# Patient Record
Sex: FEMALE | Race: ASIAN | NOT HISPANIC OR LATINO
[De-identification: names, ages, dates, MRNs, and addresses within clinical notes are randomized per-mention and may not be internally consistent; named-entity substitution may affect disease eponyms.]

---

## 2017-02-28 ENCOUNTER — APPOINTMENT (OUTPATIENT)
Dept: ENDOCRINOLOGY | Facility: CLINIC | Age: 42
End: 2017-02-28

## 2017-02-28 VITALS
SYSTOLIC BLOOD PRESSURE: 131 MMHG | BODY MASS INDEX: 28.55 KG/M2 | HEIGHT: 58 IN | WEIGHT: 136 LBS | DIASTOLIC BLOOD PRESSURE: 92 MMHG | HEART RATE: 93 BPM

## 2017-03-14 ENCOUNTER — APPOINTMENT (OUTPATIENT)
Dept: ENDOCRINOLOGY | Facility: CLINIC | Age: 42
End: 2017-03-14

## 2017-03-23 ENCOUNTER — APPOINTMENT (OUTPATIENT)
Dept: ENDOCRINOLOGY | Facility: CLINIC | Age: 42
End: 2017-03-23

## 2017-06-18 PROBLEM — Z00.00 ENCOUNTER FOR PREVENTIVE HEALTH EXAMINATION: Noted: 2017-06-18

## 2017-07-05 ENCOUNTER — RX RENEWAL (OUTPATIENT)
Age: 42
End: 2017-07-05

## 2017-07-17 ENCOUNTER — APPOINTMENT (OUTPATIENT)
Dept: INTERNAL MEDICINE | Facility: CLINIC | Age: 42
End: 2017-07-17

## 2017-07-17 ENCOUNTER — LABORATORY RESULT (OUTPATIENT)
Age: 42
End: 2017-07-17

## 2017-07-17 VITALS
SYSTOLIC BLOOD PRESSURE: 124 MMHG | DIASTOLIC BLOOD PRESSURE: 96 MMHG | TEMPERATURE: 98.7 F | HEIGHT: 58 IN | WEIGHT: 135 LBS | BODY MASS INDEX: 28.34 KG/M2 | HEART RATE: 82 BPM | OXYGEN SATURATION: 98 %

## 2017-07-17 DIAGNOSIS — R07.89 OTHER CHEST PAIN: ICD-10-CM

## 2017-07-17 DIAGNOSIS — M54.6 PAIN IN THORACIC SPINE: ICD-10-CM

## 2017-07-21 LAB
25(OH)D3 SERPL-MCNC: 34.8 NG/ML
ALBUMIN SERPL ELPH-MCNC: 4.1 G/DL
ALP BLD-CCNC: 50 U/L
ALT SERPL-CCNC: 26 U/L
ANION GAP SERPL CALC-SCNC: 15 MMOL/L
APPEARANCE: ABNORMAL
AST SERPL-CCNC: 24 U/L
BASOPHILS # BLD AUTO: 0.03 K/UL
BASOPHILS NFR BLD AUTO: 0.4 %
BILIRUB SERPL-MCNC: 0.2 MG/DL
BILIRUBIN URINE: NEGATIVE
BLOOD URINE: ABNORMAL
BUN SERPL-MCNC: 12 MG/DL
CALCIUM SERPL-MCNC: 9.3 MG/DL
CHLORIDE SERPL-SCNC: 104 MMOL/L
CHOLEST SERPL-MCNC: 206 MG/DL
CHOLEST/HDLC SERPL: 3.9 RATIO
CO2 SERPL-SCNC: 21 MMOL/L
COLOR: ABNORMAL
CREAT SERPL-MCNC: 1 MG/DL
EOSINOPHIL # BLD AUTO: 0.88 K/UL
EOSINOPHIL NFR BLD AUTO: 11.6 %
GLUCOSE QUALITATIVE U: NORMAL MG/DL
GLUCOSE SERPL-MCNC: 80 MG/DL
HBA1C MFR BLD HPLC: 5.6 %
HCT VFR BLD CALC: 42.1 %
HDLC SERPL-MCNC: 53 MG/DL
HGB BLD-MCNC: 13.6 G/DL
IMM GRANULOCYTES NFR BLD AUTO: 0.1 %
KETONES URINE: ABNORMAL
LDLC SERPL CALC-MCNC: 116 MG/DL
LEUKOCYTE ESTERASE URINE: ABNORMAL
LYMPHOCYTES # BLD AUTO: 2.5 K/UL
LYMPHOCYTES NFR BLD AUTO: 32.9 %
MAN DIFF?: NORMAL
MCHC RBC-ENTMCNC: 31 PG
MCHC RBC-ENTMCNC: 32.3 GM/DL
MCV RBC AUTO: 95.9 FL
MONOCYTES # BLD AUTO: 0.71 K/UL
MONOCYTES NFR BLD AUTO: 9.4 %
NEUTROPHILS # BLD AUTO: 3.46 K/UL
NEUTROPHILS NFR BLD AUTO: 45.6 %
NITRITE URINE: NEGATIVE
PH URINE: 5.5
PLATELET # BLD AUTO: 248 K/UL
POTASSIUM SERPL-SCNC: 4 MMOL/L
PROT SERPL-MCNC: 7.4 G/DL
PROTEIN URINE: NEGATIVE MG/DL
RBC # BLD: 4.39 M/UL
RBC # FLD: 13.6 %
SODIUM SERPL-SCNC: 140 MMOL/L
SPECIFIC GRAVITY URINE: 1.03
T4 SERPL-MCNC: 7.8 UG/DL
THYROGLOB AB SERPL-ACNC: 780 IU/ML
THYROPEROXIDASE AB SERPL IA-ACNC: 922 IU/ML
TRIGL SERPL-MCNC: 184 MG/DL
TSH SERPL-ACNC: 1.02 UIU/ML
UROBILINOGEN URINE: NORMAL MG/DL
WBC # FLD AUTO: 7.59 K/UL

## 2017-07-24 ENCOUNTER — LABORATORY RESULT (OUTPATIENT)
Age: 42
End: 2017-07-24

## 2017-07-25 LAB
APPEARANCE: CLEAR
BILIRUBIN URINE: NEGATIVE
BLOOD URINE: ABNORMAL
COLOR: YELLOW
GLUCOSE QUALITATIVE U: NORMAL MG/DL
KETONES URINE: NEGATIVE
LEUKOCYTE ESTERASE URINE: ABNORMAL
NITRITE URINE: NEGATIVE
PH URINE: 5
PROTEIN URINE: NEGATIVE MG/DL
SPECIFIC GRAVITY URINE: 1.01
UROBILINOGEN URINE: NORMAL MG/DL

## 2017-07-27 LAB — BACTERIA UR CULT: ABNORMAL

## 2018-07-30 ENCOUNTER — APPOINTMENT (OUTPATIENT)
Dept: INTERNAL MEDICINE | Facility: CLINIC | Age: 43
End: 2018-07-30
Payer: COMMERCIAL

## 2018-07-30 ENCOUNTER — LABORATORY RESULT (OUTPATIENT)
Age: 43
End: 2018-07-30

## 2018-07-30 VITALS
OXYGEN SATURATION: 99 % | BODY MASS INDEX: 30.44 KG/M2 | TEMPERATURE: 97.8 F | HEART RATE: 79 BPM | DIASTOLIC BLOOD PRESSURE: 86 MMHG | SYSTOLIC BLOOD PRESSURE: 120 MMHG | HEIGHT: 58 IN | WEIGHT: 145 LBS

## 2018-07-30 DIAGNOSIS — E01.0 IODINE-DEFICIENCY RELATED DIFFUSE (ENDEMIC) GOITER: ICD-10-CM

## 2018-07-30 DIAGNOSIS — Z13.89 ENCOUNTER FOR SCREENING FOR OTHER DISORDER: ICD-10-CM

## 2018-07-30 DIAGNOSIS — R06.01 ORTHOPNEA: ICD-10-CM

## 2018-07-30 DIAGNOSIS — G43.909 MIGRAINE, UNSPECIFIED, NOT INTRACTABLE, W/OUT STATUS MIGRAINOSUS: ICD-10-CM

## 2018-07-30 PROCEDURE — 99396 PREV VISIT EST AGE 40-64: CPT | Mod: 25

## 2018-07-30 PROCEDURE — 36415 COLL VENOUS BLD VENIPUNCTURE: CPT

## 2018-07-30 PROCEDURE — G0447 BEHAVIOR COUNSEL OBESITY 15M: CPT

## 2018-07-30 PROCEDURE — 99212 OFFICE O/P EST SF 10 MIN: CPT | Mod: 25

## 2018-07-30 PROCEDURE — G0444 DEPRESSION SCREEN ANNUAL: CPT

## 2018-07-30 RX ORDER — IBUPROFEN 600 MG/1
600 TABLET, FILM COATED ORAL
Qty: 20 | Refills: 0 | Status: DISCONTINUED | COMMUNITY
Start: 2018-03-12

## 2018-07-30 RX ORDER — RANITIDINE 150 MG/1
150 TABLET ORAL
Qty: 60 | Refills: 0 | Status: DISCONTINUED | COMMUNITY
Start: 2017-12-06

## 2018-07-30 NOTE — HISTORY OF PRESENT ILLNESS
[FreeTextEntry1] : She is now 43 years old.\par In the past year, no hospitalizations, no surgery, no new medical diagnoses [de-identified] : Now gives a history of mild orthopnea with difficulty breathing when she lies flat. This has been present now for a couple of years, but it is nonprogressive.\par

## 2018-07-30 NOTE — ASSESSMENT
[FreeTextEntry1] : Health maintenance.\par Her BMI is elevated and at least a 20 pound weight loss is recommended. We had an extended conversation lasting at least 10 minutes regarding this issue and I reviewed obesity-associated morbidities including possible contribution to her orthopnea. We also discussed dietary and lifestyle approaches to achieve the desired weight loss in detail. At the current time she declines referral to obesity management.\par Daily aerobic exercise is strongly recommended.\par No STD risk or substance abuse per patient report.\par Yearly GYN examination is strongly recommended.\par Occasional gender specific salt examination is suggested.\par HIV antibody sent with patient approval.\par States will arrange her own mammogram followup later this year. I have asked her to have results sent to me.\par No serious depression. Competent with ADLs.\par Yearly flu vaccine is recommended.\par \par Orthopnea.\par We had an extended conversation regarding possible contributing factors to this symptom. Patient specifically denies any dyspnea on exertion or significant limitation of her activity or ambulation. I reassured her that her lung exam is completely normal as is her cardiac exam.\par Based on the above, it seems likely that this symptom does not represent cardiopulmonary disease it is more likely to be the result of increasing obesity with extrathoracic lung restriction especially when lying down. Her oxygen saturation is normal.\par The patient requests, chest x-ray has been ordered.\par She is strongly encouraged to lose weight and to maintain aerobic exercise to improve her conditioning.

## 2018-07-30 NOTE — HEALTH RISK ASSESSMENT
[Good] : ~his/her~  mood as  good [No falls in past year] : Patient reported no falls in the past year [Patient reported mammogram was normal] : Patient reported mammogram was normal [Patient reported PAP Smear was normal] : Patient reported PAP Smear was normal [HIV Test offered] : HIV Test offered [With Significant Other] : lives with significant other [Employed] : employed [] :  [Sexually Active] : sexually active [Fully functional (bathing, dressing, toileting, transferring, walking, feeding)] : Fully functional (bathing, dressing, toileting, transferring, walking, feeding) [Fully functional (using the telephone, shopping, preparing meals, housekeeping, doing laundry, using] : Fully functional and needs no help or supervision to perform IADLs (using the telephone, shopping, preparing meals, housekeeping, doing laundry, using transportation, managing medications and managing finances) [Reports changes in dental health] : Reports changes in dental health [Smoke Detector] : smoke detector [Seat Belt] :  uses seat belt [Sunscreen] : uses sunscreen [Patient declined discussion] : Patient declined discussion [] : No [Change in mental status noted] : No change in mental status noted [High Risk Behavior] : no high risk behavior [Reports changes in hearing] : Reports no changes in hearing [Reports changes in vision] : Reports no changes in vision [TB Exposure] : is not being exposed to tuberculosis [MammogramDate] : 8/2017 [PapSmearDate] : 8/2017 [de-identified] : Recent oral surgery

## 2018-07-30 NOTE — COUNSELING
[Weight management counseling provided] : Weight management [Healthy eating counseling provided] : healthy eating [Activity counseling provided] : activity [Good understanding] : Patient has a good understanding of lifestyle changes and the steps needed to achieve self management goals [ - Annual Depression Screening] : Annual Depression Screening [ - Behavioral Counseling for Obesity (Face-to-Face for 15 Minutes)] : Behavioral Counseling for Obesity (Face-to-Face for 15 Minutes)

## 2018-08-02 LAB
25(OH)D3 SERPL-MCNC: 22 NG/ML
ALBUMIN SERPL ELPH-MCNC: 4.2 G/DL
ALP BLD-CCNC: 58 U/L
ALT SERPL-CCNC: 17 U/L
ANION GAP SERPL CALC-SCNC: 17 MMOL/L
APPEARANCE: ABNORMAL
AST SERPL-CCNC: 20 U/L
BASOPHILS # BLD AUTO: 0.03 K/UL
BASOPHILS NFR BLD AUTO: 0.4 %
BILIRUB SERPL-MCNC: 0.4 MG/DL
BILIRUBIN URINE: NEGATIVE
BLOOD URINE: NEGATIVE
BUN SERPL-MCNC: 13 MG/DL
CALCIUM SERPL-MCNC: 9.2 MG/DL
CHLORIDE SERPL-SCNC: 103 MMOL/L
CHOLEST SERPL-MCNC: 195 MG/DL
CHOLEST/HDLC SERPL: 4.1 RATIO
CO2 SERPL-SCNC: 20 MMOL/L
COLOR: YELLOW
CREAT SERPL-MCNC: 0.72 MG/DL
EOSINOPHIL # BLD AUTO: 0.46 K/UL
EOSINOPHIL NFR BLD AUTO: 6.8 %
GLUCOSE QUALITATIVE U: NEGATIVE MG/DL
GLUCOSE SERPL-MCNC: 85 MG/DL
HCT VFR BLD CALC: 41.8 %
HDLC SERPL-MCNC: 47 MG/DL
HGB BLD-MCNC: 13.4 G/DL
HIV1+2 AB SPEC QL IA.RAPID: NONREACTIVE
IMM GRANULOCYTES NFR BLD AUTO: 0.3 %
KETONES URINE: NEGATIVE
LDLC SERPL CALC-MCNC: 119 MG/DL
LEUKOCYTE ESTERASE URINE: ABNORMAL
LYMPHOCYTES # BLD AUTO: 2.36 K/UL
LYMPHOCYTES NFR BLD AUTO: 35 %
MAN DIFF?: NORMAL
MCHC RBC-ENTMCNC: 30.3 PG
MCHC RBC-ENTMCNC: 32.1 GM/DL
MCV RBC AUTO: 94.6 FL
MONOCYTES # BLD AUTO: 0.55 K/UL
MONOCYTES NFR BLD AUTO: 8.2 %
NEUTROPHILS # BLD AUTO: 3.32 K/UL
NEUTROPHILS NFR BLD AUTO: 49.3 %
NITRITE URINE: NEGATIVE
PH URINE: 6.5
PLATELET # BLD AUTO: 272 K/UL
POTASSIUM SERPL-SCNC: 3.7 MMOL/L
PROT SERPL-MCNC: 7.1 G/DL
PROTEIN URINE: NEGATIVE MG/DL
RBC # BLD: 4.42 M/UL
RBC # FLD: 13.8 %
SODIUM SERPL-SCNC: 140 MMOL/L
SPECIFIC GRAVITY URINE: 1.02
T4 FREE SERPL-MCNC: 1.4 NG/DL
TRIGL SERPL-MCNC: 145 MG/DL
TSH SERPL-ACNC: 0.62 UIU/ML
UROBILINOGEN URINE: NEGATIVE MG/DL
WBC # FLD AUTO: 6.74 K/UL

## 2018-11-05 ENCOUNTER — RESULT REVIEW (OUTPATIENT)
Age: 43
End: 2018-11-05

## 2018-11-27 ENCOUNTER — MOBILE ON CALL (OUTPATIENT)
Age: 43
End: 2018-11-27

## 2018-12-24 ENCOUNTER — APPOINTMENT (OUTPATIENT)
Dept: ENDOCRINOLOGY | Facility: CLINIC | Age: 43
End: 2018-12-24
Payer: COMMERCIAL

## 2018-12-24 VITALS
HEART RATE: 73 BPM | WEIGHT: 143 LBS | HEIGHT: 58 IN | DIASTOLIC BLOOD PRESSURE: 84 MMHG | BODY MASS INDEX: 30.02 KG/M2 | SYSTOLIC BLOOD PRESSURE: 122 MMHG

## 2018-12-24 LAB — HBA1C MFR BLD HPLC: 5.8

## 2018-12-24 PROCEDURE — 99215 OFFICE O/P EST HI 40 MIN: CPT | Mod: 25

## 2018-12-24 PROCEDURE — 83036 HEMOGLOBIN GLYCOSYLATED A1C: CPT | Mod: QW

## 2018-12-24 RX ORDER — CIPROFLOXACIN HYDROCHLORIDE 500 MG/1
500 TABLET, FILM COATED ORAL TWICE DAILY
Qty: 10 | Refills: 0 | Status: DISCONTINUED | COMMUNITY
Start: 2018-08-02 | End: 2018-12-24

## 2019-02-25 ENCOUNTER — APPOINTMENT (OUTPATIENT)
Dept: ENDOCRINOLOGY | Facility: CLINIC | Age: 44
End: 2019-02-25
Payer: COMMERCIAL

## 2019-02-25 VITALS
DIASTOLIC BLOOD PRESSURE: 92 MMHG | SYSTOLIC BLOOD PRESSURE: 140 MMHG | WEIGHT: 137 LBS | HEIGHT: 58 IN | HEART RATE: 81 BPM | BODY MASS INDEX: 28.76 KG/M2

## 2019-02-25 PROCEDURE — 99214 OFFICE O/P EST MOD 30 MIN: CPT

## 2019-02-25 PROCEDURE — 97802 MEDICAL NUTRITION INDIV IN: CPT

## 2019-02-25 NOTE — HISTORY OF PRESENT ILLNESS
[FreeTextEntry1] : Ms. Hale is a 43 year-old woman with a history of polycystic ovarian syndrome, overweight, elevated Hg A1c, euthyroid Hashimoto's disease, vitamin D deficiency presenting for follow-up. I saw her for an initial visit in December 2018; she is a former patient of Dr. Bui.\par \par Polycystic ovarian syndrome/overweight/elevated Hg A1c. Point-of-care Hg A1c 5.8% in December 2018. \par She was diagnosed with polycystic ovarian syndrome in her 20s in the setting of irregular menses, acne, hirsutism. Acne and hirsutism not currently an issue for her.\par She was previously on a combined oral contraceptive pill, off since around March 2017. She has had withdrawal bleeding every month, although periods are not coming completely regularly.\par Her early adult weight was 90 pounds, gradually increased up to 145 pounds. She was down to 115 pounds following a very restrictive diet, but gained back the weight. She was down to 135 pounds on bupropion and metformin. \par She tolerated bupropion well. She complained of forgetfulness on metformin. \par \par Euthyroid Hashimoto's disease.\par She has a history of positive thyroid antibodies. \par Most recent thyroid function tests from July 2018 were normal.\par \par Vitamin D deficiency.\par Most recent 25-hydroxyvitamin D 22.0 ng/mL in July 2018; down as low as 10.3 ng/mL in November 2016.\par \par Interim History \par Last visit we restarted bupropion and ergocalciferol. She is tolerating well. \par She saw nutrition today.\par She will be celebrating her 23rd wedding anniversary in April. \par She lost 6 pounds since last visit. She stopped drinking soda and decreasing sugar. Hoarse voice resolved. No denies dysphagia, fixed/hard neck mass, shortness of breath. She denies palpitations, diarrhea/constipation, hair/skin changes, depression. Menses are regular.\par Medical and surgical history, medications, allergies, social and family history reviewed and updated as needed.

## 2019-02-25 NOTE — ASSESSMENT
[FreeTextEntry1] : Polycystic ovarian syndrome/overweight/elevated Hg A1c. Point-of-care Hg A1c 5.8% in December 2018. She is having withdrawal bleeding every month and facial hair/acne not currently an issue. She prefers to remain off an oral contraceptive pill for now. We reviewed lifestyle modification for weight loss. Last visit we restarted bupropion and she is tolerating well. I congratulated her on her weight loss.\par Lifestyle modification\par Follow-up nutrition\par Continue bupropion  mg twice daily\par \par Euthyroid Hashimoto's disease. She has a history of positive thyroid antibodies. She has been clinically and biochemically euthyroid.\par Check TSH annually or earlier if symptoms\par \par Vitamin D deficiency. Most recent 25-hydroxyvitamin D 22.0 ng/mL in July 2018; down as low as 10.3 ng/mL in November 2016.\par Continue ergocalciferol 50,000 intl units weekly x 3 months, then every 2 weeks\par \par Return to clinic in 3 months.

## 2019-02-25 NOTE — PHYSICAL EXAM
[Alert] : alert [No Acute Distress] : no acute distress [Healthy Appearance] : healthy appearance [Normal Sclera/Conjunctiva] : normal sclera/conjunctiva [Normal Oropharynx] : the oropharynx was normal [No Neck Mass] : no neck mass was observed [Supple] : the neck was supple [No LAD] : no lymphadenopathy [Thyroid Not Enlarged] : the thyroid was not enlarged [No Thyroid Nodules] : there were no palpable thyroid nodules [Normal Rate and Effort] : normal respiratory rhythm and effort [Clear to Auscultation] : lungs were clear to auscultation bilaterally [Normal Rate] : heart rate was normal  [Normal S1, S2] : normal S1 and S2 [Regular Rhythm] : with a regular rhythm [No Stigmata of Cushings Syndrome] : no stigmata of cushings syndrome [Normal Gait] : normal gait [Normal Insight/Judgement] : insight and judgment were intact [Kyphosis] : no kyphosis present [Acne] : no acne [Hirsutism] : no hirsutism [Acanthosis Nigricans] : no acanthosis nigricans [de-identified] : no moon facies, no supraclavicular fat pads

## 2019-04-04 ENCOUNTER — TRANSCRIPTION ENCOUNTER (OUTPATIENT)
Age: 44
End: 2019-04-04

## 2019-04-18 ENCOUNTER — RX RENEWAL (OUTPATIENT)
Age: 44
End: 2019-04-18

## 2019-05-06 ENCOUNTER — APPOINTMENT (OUTPATIENT)
Dept: ENDOCRINOLOGY | Facility: CLINIC | Age: 44
End: 2019-05-06

## 2019-08-13 ENCOUNTER — RX RENEWAL (OUTPATIENT)
Age: 44
End: 2019-08-13

## 2019-08-18 ENCOUNTER — LABORATORY RESULT (OUTPATIENT)
Age: 44
End: 2019-08-18

## 2019-08-19 ENCOUNTER — APPOINTMENT (OUTPATIENT)
Dept: INTERNAL MEDICINE | Facility: CLINIC | Age: 44
End: 2019-08-19
Payer: COMMERCIAL

## 2019-08-19 VITALS
DIASTOLIC BLOOD PRESSURE: 78 MMHG | BODY MASS INDEX: 29.26 KG/M2 | SYSTOLIC BLOOD PRESSURE: 108 MMHG | WEIGHT: 140 LBS | OXYGEN SATURATION: 98 % | TEMPERATURE: 98.1 F | HEART RATE: 87 BPM

## 2019-08-19 DIAGNOSIS — Z12.11 ENCOUNTER FOR SCREENING FOR MALIGNANT NEOPLASM OF COLON: ICD-10-CM

## 2019-08-19 DIAGNOSIS — Z80.0 ENCOUNTER FOR SCREENING FOR MALIGNANT NEOPLASM OF COLON: ICD-10-CM

## 2019-08-19 DIAGNOSIS — N39.0 URINARY TRACT INFECTION, SITE NOT SPECIFIED: ICD-10-CM

## 2019-08-19 DIAGNOSIS — Z23 ENCOUNTER FOR IMMUNIZATION: ICD-10-CM

## 2019-08-19 DIAGNOSIS — M25.511 PAIN IN RIGHT SHOULDER: ICD-10-CM

## 2019-08-19 PROCEDURE — 99396 PREV VISIT EST AGE 40-64: CPT | Mod: 25

## 2019-08-19 PROCEDURE — 36415 COLL VENOUS BLD VENIPUNCTURE: CPT

## 2019-08-30 ENCOUNTER — APPOINTMENT (OUTPATIENT)
Dept: MAMMOGRAPHY | Facility: CLINIC | Age: 44
End: 2019-08-30

## 2019-10-07 ENCOUNTER — APPOINTMENT (OUTPATIENT)
Dept: GASTROENTEROLOGY | Facility: HOSPITAL | Age: 44
End: 2019-10-07

## 2019-11-08 ENCOUNTER — APPOINTMENT (OUTPATIENT)
Dept: GASTROENTEROLOGY | Facility: CLINIC | Age: 44
End: 2019-11-08

## 2019-11-11 ENCOUNTER — RX RENEWAL (OUTPATIENT)
Age: 44
End: 2019-11-11

## 2019-12-16 ENCOUNTER — RX RENEWAL (OUTPATIENT)
Age: 44
End: 2019-12-16

## 2020-03-09 ENCOUNTER — TRANSCRIPTION ENCOUNTER (OUTPATIENT)
Age: 45
End: 2020-03-09

## 2020-03-09 ENCOUNTER — RX RENEWAL (OUTPATIENT)
Age: 45
End: 2020-03-09

## 2020-04-13 ENCOUNTER — APPOINTMENT (OUTPATIENT)
Dept: ENDOCRINOLOGY | Facility: CLINIC | Age: 45
End: 2020-04-13

## 2020-08-17 ENCOUNTER — APPOINTMENT (OUTPATIENT)
Dept: ENDOCRINOLOGY | Facility: CLINIC | Age: 45
End: 2020-08-17
Payer: COMMERCIAL

## 2020-08-17 VITALS
HEART RATE: 67 BPM | WEIGHT: 145 LBS | HEIGHT: 58 IN | DIASTOLIC BLOOD PRESSURE: 85 MMHG | BODY MASS INDEX: 30.44 KG/M2 | SYSTOLIC BLOOD PRESSURE: 124 MMHG

## 2020-08-17 LAB
GLUCOSE BLDC GLUCOMTR-MCNC: 139
HBA1C MFR BLD HPLC: 5.6

## 2020-08-17 PROCEDURE — 99214 OFFICE O/P EST MOD 30 MIN: CPT | Mod: 25

## 2020-08-17 PROCEDURE — 82962 GLUCOSE BLOOD TEST: CPT

## 2020-08-17 PROCEDURE — 83036 HEMOGLOBIN GLYCOSYLATED A1C: CPT | Mod: NC,QW

## 2020-08-17 RX ORDER — NORETHINDRONE ACETATE AND ETHINYL ESTRADIOL AND FERROUS FUMARATE 1MG-20(21)
1-20 KIT ORAL
Qty: 28 | Refills: 0 | Status: DISCONTINUED | COMMUNITY
Start: 2019-10-28 | End: 2020-08-17

## 2020-08-17 RX ORDER — CALCIUM CARBONATE/VITAMIN D3 600 MG-10
TABLET ORAL
Refills: 0 | Status: DISCONTINUED | COMMUNITY
End: 2020-08-17

## 2020-08-17 RX ORDER — BUPROPION HYDROCHLORIDE 150 MG/1
150 TABLET, FILM COATED, EXTENDED RELEASE ORAL
Qty: 180 | Refills: 0 | Status: DISCONTINUED | COMMUNITY
Start: 2018-12-24 | End: 2020-08-17

## 2020-08-18 LAB
25(OH)D3 SERPL-MCNC: 71.2 NG/ML
ALBUMIN SERPL ELPH-MCNC: 4.5 G/DL
ALP BLD-CCNC: 57 U/L
ALT SERPL-CCNC: 17 U/L
ANION GAP SERPL CALC-SCNC: 13 MMOL/L
AST SERPL-CCNC: 16 U/L
BASOPHILS # BLD AUTO: 0.06 K/UL
BASOPHILS NFR BLD AUTO: 0.8 %
BILIRUB SERPL-MCNC: 0.4 MG/DL
BUN SERPL-MCNC: 10 MG/DL
CALCIUM SERPL-MCNC: 9.1 MG/DL
CHLORIDE SERPL-SCNC: 105 MMOL/L
CHOLEST SERPL-MCNC: 226 MG/DL
CHOLEST/HDLC SERPL: 5.4 RATIO
CO2 SERPL-SCNC: 22 MMOL/L
CREAT SERPL-MCNC: 0.8 MG/DL
EOSINOPHIL # BLD AUTO: 0.44 K/UL
EOSINOPHIL NFR BLD AUTO: 5.5 %
GLUCOSE SERPL-MCNC: 98 MG/DL
HCT VFR BLD CALC: 47 %
HDLC SERPL-MCNC: 42 MG/DL
HGB BLD-MCNC: 15 G/DL
IMM GRANULOCYTES NFR BLD AUTO: 0.1 %
LDLC SERPL CALC-MCNC: 153 MG/DL
LYMPHOCYTES # BLD AUTO: 1.99 K/UL
LYMPHOCYTES NFR BLD AUTO: 25.1 %
MAN DIFF?: NORMAL
MCHC RBC-ENTMCNC: 31.9 GM/DL
MCHC RBC-ENTMCNC: 31.9 PG
MCV RBC AUTO: 100 FL
MONOCYTES # BLD AUTO: 0.67 K/UL
MONOCYTES NFR BLD AUTO: 8.4 %
NEUTROPHILS # BLD AUTO: 4.77 K/UL
NEUTROPHILS NFR BLD AUTO: 60.1 %
PLATELET # BLD AUTO: 278 K/UL
POTASSIUM SERPL-SCNC: 3.8 MMOL/L
PROT SERPL-MCNC: 7.2 G/DL
RBC # BLD: 4.7 M/UL
RBC # FLD: 13.2 %
SODIUM SERPL-SCNC: 140 MMOL/L
TRIGL SERPL-MCNC: 158 MG/DL
TSH SERPL-ACNC: 1.11 UIU/ML
VIT B12 SERPL-MCNC: 879 PG/ML
WBC # FLD AUTO: 7.94 K/UL

## 2020-08-24 ENCOUNTER — APPOINTMENT (OUTPATIENT)
Dept: INTERNAL MEDICINE | Facility: CLINIC | Age: 45
End: 2020-08-24
Payer: COMMERCIAL

## 2020-08-24 ENCOUNTER — LABORATORY RESULT (OUTPATIENT)
Age: 45
End: 2020-08-24

## 2020-08-24 VITALS
HEART RATE: 78 BPM | WEIGHT: 139 LBS | SYSTOLIC BLOOD PRESSURE: 127 MMHG | BODY MASS INDEX: 29.18 KG/M2 | DIASTOLIC BLOOD PRESSURE: 87 MMHG | OXYGEN SATURATION: 97 % | TEMPERATURE: 98.1 F | HEIGHT: 58 IN

## 2020-08-24 DIAGNOSIS — Z11.59 ENCOUNTER FOR SCREENING FOR OTHER VIRAL DISEASES: ICD-10-CM

## 2020-08-24 DIAGNOSIS — R13.10 DYSPHAGIA, UNSPECIFIED: ICD-10-CM

## 2020-08-24 PROCEDURE — 36415 COLL VENOUS BLD VENIPUNCTURE: CPT

## 2020-08-24 PROCEDURE — 99213 OFFICE O/P EST LOW 20 MIN: CPT | Mod: 25

## 2020-08-24 PROCEDURE — 99396 PREV VISIT EST AGE 40-64: CPT | Mod: 25

## 2020-08-26 LAB
25(OH)D3 SERPL-MCNC: 63.1 NG/ML
ALBUMIN SERPL ELPH-MCNC: 4.9 G/DL
ALP BLD-CCNC: 58 U/L
ALT SERPL-CCNC: 19 U/L
ANION GAP SERPL CALC-SCNC: 13 MMOL/L
APPEARANCE: ABNORMAL
AST SERPL-CCNC: 22 U/L
BASOPHILS # BLD AUTO: 0.04 K/UL
BASOPHILS NFR BLD AUTO: 0.7 %
BILIRUB SERPL-MCNC: 0.6 MG/DL
BILIRUBIN URINE: NEGATIVE
BLOOD URINE: ABNORMAL
BUN SERPL-MCNC: 10 MG/DL
CALCIUM SERPL-MCNC: 10.1 MG/DL
CHLORIDE SERPL-SCNC: 101 MMOL/L
CHOLEST SERPL-MCNC: 237 MG/DL
CHOLEST/HDLC SERPL: 5.2 RATIO
CO2 SERPL-SCNC: 26 MMOL/L
COLOR: YELLOW
CREAT SERPL-MCNC: 1 MG/DL
EOSINOPHIL # BLD AUTO: 0.42 K/UL
EOSINOPHIL NFR BLD AUTO: 7.2 %
ESTIMATED AVERAGE GLUCOSE: 108 MG/DL
GLUCOSE QUALITATIVE U: NEGATIVE
GLUCOSE SERPL-MCNC: 115 MG/DL
HBA1C MFR BLD HPLC: 5.4 %
HCT VFR BLD CALC: 48.6 %
HDLC SERPL-MCNC: 45 MG/DL
HGB BLD-MCNC: 15.7 G/DL
IMM GRANULOCYTES NFR BLD AUTO: 0.2 %
KETONES URINE: NORMAL
LDLC SERPL CALC-MCNC: 172 MG/DL
LEUKOCYTE ESTERASE URINE: ABNORMAL
LYMPHOCYTES # BLD AUTO: 2.23 K/UL
LYMPHOCYTES NFR BLD AUTO: 38 %
MAN DIFF?: NORMAL
MCHC RBC-ENTMCNC: 32 PG
MCHC RBC-ENTMCNC: 32.3 GM/DL
MCV RBC AUTO: 99.2 FL
MONOCYTES # BLD AUTO: 0.54 K/UL
MONOCYTES NFR BLD AUTO: 9.2 %
NEUTROPHILS # BLD AUTO: 2.63 K/UL
NEUTROPHILS NFR BLD AUTO: 44.7 %
NITRITE URINE: NEGATIVE
PH URINE: 6
PLATELET # BLD AUTO: 289 K/UL
POTASSIUM SERPL-SCNC: 3.6 MMOL/L
PROT SERPL-MCNC: 7.9 G/DL
PROTEIN URINE: NORMAL
RBC # BLD: 4.9 M/UL
RBC # FLD: 13.1 %
SARS-COV-2 IGG SERPL IA-ACNC: 0.04 INDEX
SARS-COV-2 IGG SERPL QL IA: NEGATIVE
SODIUM SERPL-SCNC: 140 MMOL/L
SPECIFIC GRAVITY URINE: 1.02
T4 FREE SERPL-MCNC: 1.6 NG/DL
TRIGL SERPL-MCNC: 98 MG/DL
TSH SERPL-ACNC: 1.25 UIU/ML
UROBILINOGEN URINE: NORMAL
WBC # FLD AUTO: 5.87 K/UL

## 2020-09-21 ENCOUNTER — APPOINTMENT (OUTPATIENT)
Dept: INTERNAL MEDICINE | Facility: CLINIC | Age: 45
End: 2020-09-21

## 2020-10-01 ENCOUNTER — RX RENEWAL (OUTPATIENT)
Age: 45
End: 2020-10-01

## 2020-10-27 NOTE — PHYSICAL EXAM
[Alert] : alert [Healthy Appearance] : healthy appearance [Normal Sclera/Conjunctiva] : normal sclera/conjunctiva [No Acute Distress] : no acute distress [Supple] : the neck was supple [No LAD] : no lymphadenopathy [No Neck Mass] : no neck mass was observed [Thyroid Not Enlarged] : the thyroid was not enlarged [Normal Gait] : normal gait [No Stigmata of Cushings Syndrome] : no stigmata of Cushings Syndrome [Normal Insight/Judgement] : insight and judgment were intact [Kyphosis] : no kyphosis present [Acanthosis Nigricans] : no acanthosis nigricans [de-identified] : no moon facies, no supraclavicular fat pads

## 2020-10-27 NOTE — ASSESSMENT
[FreeTextEntry1] : Polycystic ovarian syndrome. Elevated body mass index. History of elevated HbA1c, most recently normal. She met criteria for polycystic ovarian syndrome in the setting of oligomenorrhea and clinical evidence of hyperandrogenism. We discussed the potential risks of polycystic ovarian syndrome, including but not limited to endometrial hyperplasia/cancer, issues with androgen excess, metabolic effects/insulin resistance. We discussed that first line therapy for polycystic ovarian syndrome is a combined oral contraceptive pill. She prefers to remain off an oral contraceptive pill for now. She is having withdrawal bleeding every month and hirsutism/acne not currently of concern. We discussed use of progesterone as needed if lack of withdrawal bleeding for 90 days. We discussed that weight loss should improve her symptoms. We reviewed lifestyle modification for weight loss. Insurance does not cover visits with nutrition. We discussed pharmacologic options for weight loss. She is amenable to a trial of phentermine. We discussed the risks and benefits, including but not limited to palpitations, sleep disturbance, hypertension. \par Check laboratory evaluation as below\par Lifestyle modification\par Start phentermine 15 mg daily\par \par Euthyroid Hashimoto's disease. She has a history of positive thyroid antibodies. She has been clinically and biochemically euthyroid.\par Check TSH annually or earlier if symptoms\par \par Vitamin D deficiency. \par Continue ergocalciferol 50,000 intl units every two weeks pending level\par \par Return to clinic in 3 months.

## 2020-10-27 NOTE — ADDENDUM
[FreeTextEntry1] : Recent laboratory results as below; discussed with Ms. Hale. Her 10 year risk of heart disease or stroke is 1.2% and statin therapy not indicated for lipids. TSH within range. Other test results within range. 8/18/20\par \par Ms. Hale is having some constipation with phentermine. I advised increased fiber intake. She will keep me updated. 10/15/20\par \par JENNIFER COOPER - 10/27/2020 11:56 AM\par Phentermine denied. has to try 6 months of karla. see scanned. [id number]\par

## 2020-10-27 NOTE — HISTORY OF PRESENT ILLNESS
[FreeTextEntry1] : Ms. Hale is a 45 year-old woman with a history of polycystic ovarian syndrome, elevated body mass index, elevated HbA1c, Hashimoto's disease, vitamin D deficiency presenting for follow-up. I saw her for an initial visit in December 2018 and last in February 2019; she is a former patient of Dr. Bui.\par \par Polycystic ovarian syndrome. Elevated body mass index. Elevated HbA1c. Point-of-care HbA1c 5.6% and blood glucose 139 mg/dL today.\par She was diagnosed with polycystic ovarian syndrome in her 20s in the setting of oligomenorrhea, acne, hirsutism. \par She was previously on a combined oral contraceptive pill, off since around March 2017. She has had withdrawal bleeding every month, although periods are not completely regular.\par Acne, hirsutism currently under good control.\par Her early adult weight was 90 pounds, gradually increased up to 145 pounds. She was down to 115 pounds following a very restrictive diet, but gained back the weight. She was down to 135 pounds on bupropion and metformin. \par She had mood issues on bupropion. She had forgetfulness with metformin. \par \par Euthyroid Hashimoto's disease.\par She has a history of positive thyroid antibodies. \par She has been biochemically euthyroid.\par \par Vitamin D deficiency.\par She is taking ergocalciferol 50,000 intl units every two weeks.\par \par Interim History \par She had a miscarriage last year. She is not interested in in future pregnancy. \par She stopped bupropion due to concern for altered mood.\par She has some wheezing when she lays supine. \par She has back, shoulder, arm pain. \par She has menstrual bleeding monthly, although not always regular in timing.\par Medical and surgical history, medications, allergies, social and family history reviewed and updated as needed.

## 2020-11-16 ENCOUNTER — APPOINTMENT (OUTPATIENT)
Dept: ENDOCRINOLOGY | Facility: CLINIC | Age: 45
End: 2020-11-16

## 2020-11-16 ENCOUNTER — APPOINTMENT (OUTPATIENT)
Dept: GASTROENTEROLOGY | Facility: CLINIC | Age: 45
End: 2020-11-16

## 2020-11-27 ENCOUNTER — APPOINTMENT (OUTPATIENT)
Dept: ENDOCRINOLOGY | Facility: CLINIC | Age: 45
End: 2020-11-27
Payer: COMMERCIAL

## 2020-11-27 PROCEDURE — 99214 OFFICE O/P EST MOD 30 MIN: CPT | Mod: 95

## 2020-11-27 RX ORDER — PHENTERMINE HYDROCHLORIDE 15 MG/1
15 CAPSULE ORAL
Qty: 30 | Refills: 0 | Status: DISCONTINUED | COMMUNITY
Start: 2020-08-17 | End: 2020-11-27

## 2020-11-27 NOTE — HISTORY OF PRESENT ILLNESS
[Home] : at home, [unfilled] , at the time of the visit. [Medical Office: ([facility])___] : at the medical office located in  [Verbal consent obtained from patient] : the patient, [unfilled] [FreeTextEntry1] : Ms. Hale is a 45 year-old woman with a history of polycystic ovarian syndrome, elevated body mass index, elevated HbA1c, Hashimoto's disease, vitamin D deficiency presenting for follow-up. I saw her for an initial visit in December 2018 and last in August 2020; she is a former patient of Dr. Jany Bui.\par \par Polycystic ovarian syndrome. Elevated body mass index. Elevated HbA1c. HbA1c 5.4% in August 2020.\par She was diagnosed with polycystic ovarian syndrome in her 20s in the setting of oligomenorrhea, acne, hirsutism. \par She was previously on a combined oral contraceptive pill, off since around March 2017. She has had withdrawal bleeding every month, although periods are not completely regular.\par Acne, hirsutism currently under good control.\par Her early adult weight was 90 pounds, gradually increased up to 145 pounds. She was down to 115 pounds following a very restrictive diet, but gained back the weight. She was down to 135 pounds on bupropion and metformin. \par She had mood issues on bupropion. She had forgetfulness with metformin. We started phentermine in August 2020, however, she has had tachycardia. \par \par Euthyroid Hashimoto's disease.\par She has a history of positive thyroid antibodies. \par She has been biochemically euthyroid.\par \par Vitamin D deficiency.\par She is taking ergocalciferol 50,000 intl units every two weeks.\par \par Interim History \par Laboratory results from last visit as below. Her 10 year risk of heart disease or stroke was 1.2% and statin therapy not indicated for lipid panel. TSH within range. Vitamin D within range. Other test results within range. \par She has been having tachycardia on phentermine and has discontinued. \par She lost weight to 132 pounds. Her goal weight is around 120 pounds. Her last menstrual period was November 21, 2020.\par Medical and surgical history, medications, allergies, social and family history reviewed and updated as needed.

## 2020-11-27 NOTE — ASSESSMENT
[FreeTextEntry1] : Polycystic ovarian syndrome. Elevated body mass index. History of elevated HbA1c, most recently normal. She met criteria for polycystic ovarian syndrome in the setting of oligomenorrhea and clinical evidence of hyperandrogenism. We discussed the potential risks of polycystic ovarian syndrome, including but not limited to endometrial hyperplasia/cancer, issues with androgen excess, metabolic effects/insulin resistance. We discussed that first line therapy for polycystic ovarian syndrome is a combined oral contraceptive pill. She prefers to remain off an oral contraceptive pill for now. She is having withdrawal bleeding every month and hirsutism/acne not currently of concern. We discussed use of progesterone as needed if lack of withdrawal bleeding for 90 days. We discussed that weight loss should improve her symptoms. We reviewed lifestyle modification for weight loss. She is interested in intermittent fasting. Insurance does not cover visits with nutrition. We discussed pharmacologic options for weight loss. She has not tolerated metformin, bupropion, or phentermine. We will monitor off pharmacologic therapy for now; we can consider a trial of topiramate as needed. \par Lifestyle modification\par \par Euthyroid Hashimoto's disease. She has a history of positive thyroid antibodies. She has been clinically and biochemically euthyroid.\par Check TSH annually or earlier if symptoms\par \par Vitamin D deficiency. \par Continue ergocalciferol 50,000 intl units every two weeks\par \par Return to clinic in 3 months.

## 2020-12-21 PROBLEM — Z12.11 ENCOUNTER FOR COLONOSCOPY IN PATIENT WITH FAMILY HISTORY OF COLON CANCER: Status: RESOLVED | Noted: 2019-08-19 | Resolved: 2020-12-21

## 2021-03-02 ENCOUNTER — APPOINTMENT (OUTPATIENT)
Dept: INTERNAL MEDICINE | Facility: CLINIC | Age: 46
End: 2021-03-02
Payer: COMMERCIAL

## 2021-03-02 DIAGNOSIS — J35.8 OTHER CHRONIC DISEASES OF TONSILS AND ADENOIDS: ICD-10-CM

## 2021-03-02 PROCEDURE — 99442: CPT | Mod: 95

## 2021-03-05 ENCOUNTER — APPOINTMENT (OUTPATIENT)
Dept: OTOLARYNGOLOGY | Facility: CLINIC | Age: 46
End: 2021-03-05
Payer: COMMERCIAL

## 2021-03-05 PROCEDURE — 99072 ADDL SUPL MATRL&STAF TM PHE: CPT

## 2021-03-05 PROCEDURE — 99203 OFFICE O/P NEW LOW 30 MIN: CPT | Mod: 25

## 2021-03-05 PROCEDURE — 31231 NASAL ENDOSCOPY DX: CPT

## 2021-03-05 NOTE — HISTORY OF PRESENT ILLNESS
[de-identified] : KEV BLUNT is a 45 year patient who I have not seen in some time. She has a history of reflux and chronic rhinitis. She noticed a possible cyst involving the right tonsil about one week ago. She said it appears to have decreased in size. She has no pain, dysphagia, or bleeding. She does have a history of reflux. She has had heartburn, throat phlegm, and throat clearing. She is on pepcid. She also has dryness in the nasal cavity which is worse on the left side. However, she also has a nasal drainage with nasal congestion. She uses a humidifier. She does not smoke. Her reflux symptoms index was 16.  SHe has Hashimoto's disease. She feels a little fullness in the submandibular triangle bilaterally but has not noticed any masses.  I reviewed her prior ultrasound results and her recent chart notes.

## 2021-03-05 NOTE — ASSESSMENT
[FreeTextEntry1] : She noticed a possible right tonsil lesion last week. She thinks it has decreased in size since then. She has no throat pain or dysphagia. On exam, the right superior tonsil was more rounded and slightly more prominent than the left side. It is possible this could be a submucosal cyst or prominent tonsil tissue.  There was no erythema, exudate or trismus.  She does have a history of reflux and has had some reflux-related symptoms. She is on Pepcid. She also has chronic rhinitis. She is not currently using nasal or sinus medications.\par \par PLAN\par \par -findings and management options discussed in detail with the patient. \par - Hydration, humidification\par - Reflux precautions and elevation of the head of bed at night.   \par - continue pepcid\par - moisturizing nasal gel prn\par - trial of Sensimist nasal spray. nasal saline rinses prn. \par - We discussed obtaining an imaging study such as CT scan to evaluate the right tonsil. It appears benign and may have decreased in size. We decided to observe it for a little bit since she only noticed it last week. I am going to bring her back in 4 weeks to check it again. If there are any changes before then, she will let me know and return urgently.\par - follow up in 4 weeks\par - call and return earlier if any concerns

## 2021-04-09 ENCOUNTER — APPOINTMENT (OUTPATIENT)
Dept: OTOLARYNGOLOGY | Facility: CLINIC | Age: 46
End: 2021-04-09
Payer: COMMERCIAL

## 2021-04-09 VITALS — WEIGHT: 139 LBS | HEIGHT: 58 IN | BODY MASS INDEX: 29.18 KG/M2 | TEMPERATURE: 97.7 F

## 2021-04-09 DIAGNOSIS — J35.1 HYPERTROPHY OF TONSILS: ICD-10-CM

## 2021-04-09 DIAGNOSIS — G47.8 OTHER SLEEP DISORDERS: ICD-10-CM

## 2021-04-09 PROCEDURE — 99214 OFFICE O/P EST MOD 30 MIN: CPT

## 2021-04-09 PROCEDURE — 99072 ADDL SUPL MATRL&STAF TM PHE: CPT

## 2021-04-09 RX ORDER — MILK THISTLE 150 MG
CAPSULE ORAL
Refills: 0 | Status: DISCONTINUED | COMMUNITY
End: 2021-04-09

## 2021-04-09 RX ORDER — FLUTICASONE FUROATE 27.5 UG/1
27.5 SPRAY, METERED NASAL DAILY
Qty: 1 | Refills: 3 | Status: DISCONTINUED | COMMUNITY
Start: 2021-03-05 | End: 2021-04-09

## 2021-04-09 RX ORDER — FAMOTIDINE 20 MG/1
20 TABLET, FILM COATED ORAL
Qty: 90 | Refills: 3 | Status: DISCONTINUED | COMMUNITY
Start: 2020-08-24 | End: 2021-04-09

## 2021-04-09 RX ORDER — FLUTICASONE PROPIONATE 50 UG/1
50 SPRAY, METERED NASAL DAILY
Qty: 1 | Refills: 3 | Status: DISCONTINUED | COMMUNITY
Start: 2021-03-05 | End: 2021-04-09

## 2021-04-09 NOTE — ASSESSMENT
[FreeTextEntry1] : She has a long history of a globus sensation with wheezing at night. She also has mild asymmetry of the right tonsil. It looks possibly a little small on exam today. She does suffer from reflux. She has been on Pepcid. She also tried apple cider vinegar.  However, it is unclear if it is completely controlled. She also suffers from  chronic rhinitis.\par \par PLAN\par \par -findings and management options discussed in detail with the patient. \par - continue Reflux precautions and elevation of the head of bed at night.   \par - trial of prilosec to see if that helps more than the pepcid\par - GI evaluation recommended to rule out esophageal changes. I also spoke with her about ph probe testing\par - moisturizing nasal gel , nasal steroid spray. nasal saline rinses prn. \par - She would like to proceed with a CT scan with contrast of the neck to ensure there is no suspicious tonsil lesion on the right side.\par - I am also going to send her for home sleep study to rule out obstructive sleep apnea.\par - follow up after the tests. i asked her to call me once she has the CT scan. \par - call and return earlier if any concerns

## 2021-04-09 NOTE — HISTORY OF PRESENT ILLNESS
[de-identified] : KEV BLUNT is a 46 year patient With a history of reflux, chronic rhinitis, and possible right tonsil lesion/hypertrophy. She has a more than one year history of a globus sensation on the right side. It is worse at night when she sleeps. She feels like she wheezes at night which wakes her up. She does have daytime fatigue. She has not had a sleep study. She has been on Pepcid for reflux. She has also tried apple cider vinegar. She thinks it may be a little better. She has not seen a gastroenterologist recently. She also has chronic rhinitis. She tried Sensimist spray a couple of times. It may have helped.

## 2021-04-18 ENCOUNTER — APPOINTMENT (OUTPATIENT)
Dept: SLEEP CENTER | Facility: HOME HEALTH | Age: 46
End: 2021-04-18
Payer: COMMERCIAL

## 2021-04-18 ENCOUNTER — OUTPATIENT (OUTPATIENT)
Dept: OUTPATIENT SERVICES | Facility: HOSPITAL | Age: 46
LOS: 1 days | End: 2021-04-18
Payer: COMMERCIAL

## 2021-04-18 PROCEDURE — 95800 SLP STDY UNATTENDED: CPT | Mod: 26

## 2021-04-18 PROCEDURE — 95800 SLP STDY UNATTENDED: CPT

## 2021-04-19 ENCOUNTER — OUTPATIENT (OUTPATIENT)
Dept: OUTPATIENT SERVICES | Facility: HOSPITAL | Age: 46
LOS: 1 days | End: 2021-04-19

## 2021-04-19 ENCOUNTER — RESULT REVIEW (OUTPATIENT)
Age: 46
End: 2021-04-19

## 2021-04-19 ENCOUNTER — APPOINTMENT (OUTPATIENT)
Dept: CT IMAGING | Facility: CLINIC | Age: 46
End: 2021-04-19
Payer: COMMERCIAL

## 2021-04-19 DIAGNOSIS — G47.33 OBSTRUCTIVE SLEEP APNEA (ADULT) (PEDIATRIC): ICD-10-CM

## 2021-04-19 PROCEDURE — 70491 CT SOFT TISSUE NECK W/DYE: CPT | Mod: 26

## 2021-04-26 ENCOUNTER — APPOINTMENT (OUTPATIENT)
Dept: OTOLARYNGOLOGY | Facility: CLINIC | Age: 46
End: 2021-04-26
Payer: COMMERCIAL

## 2021-04-26 VITALS — WEIGHT: 139 LBS | TEMPERATURE: 97.6 F | HEIGHT: 58 IN | BODY MASS INDEX: 29.18 KG/M2

## 2021-04-26 PROCEDURE — 99072 ADDL SUPL MATRL&STAF TM PHE: CPT

## 2021-04-26 PROCEDURE — 99213 OFFICE O/P EST LOW 20 MIN: CPT

## 2021-04-26 NOTE — HISTORY OF PRESENT ILLNESS
[de-identified] : KEV BLUNT is a 46 year patient is here to review her CT scan of the neck and sleep study. She still continues to have a globus sensation and wheezing at night. The globus sensation may be a little bit better. She has been on omeprazole but may not always remember to take it. She tried the nasal steroid spray last night and it may have helped. CT scan did not show any masses.  There was no significant asymmetry or suspicious finding in the tonsils or rest of the scan. She did have a couple of tonsilliths. There was a small sialolith or calcification in the left superficial and superior parotid gland about 6 mm in size without ductal dilatation. The thyroid waslightly enlarged symmetrically heterogeneous without dominant nodule or mass. The sinuses were reportedly clear except for minimal right posterior ethmoid opacification. I was able to review the images. The septum wadeviated the right. There was a small left middle turbinate tariq bullosa as well.  The sleep study was also reviewed. There was no significant sleep apnea.

## 2021-04-26 NOTE — ASSESSMENT
[FreeTextEntry1] : She continues to have a globus sensation with wheezing at night. CT scan showed no suspicious findings. Sleep study was also negative. Her symptoms are likely due to reflux and chronic rhinitis.\par \par PLAN\par \par -findings and management options discussed in detail with the patient. \par - continue Reflux precautions and elevation of the head of bed at night.   \par - continue prilosec for another 2-3 weeks. \par - I recommended GI evaluation and possible ph probe testing. She would like to wait a little longer\par - she will use the nasal steroid spray daily. she may use nasal saline rinses prn. \par - we will monitor the sialolith/calcification in the left parotid gland. She has no symptoms or infections\par - she is followed by an endocrinologist- there were no nodules on the US\par - I asked her to call me in 3 weeks to let me know how she is doing. We will decide on further treatment depending on her symptoms. If she is doing well, I will see her back in 3 months. \par - call and return earlier if any concerns

## 2021-06-01 ENCOUNTER — NON-APPOINTMENT (OUTPATIENT)
Age: 46
End: 2021-06-01

## 2021-06-01 ENCOUNTER — EMERGENCY (EMERGENCY)
Facility: HOSPITAL | Age: 46
LOS: 1 days | Discharge: HOME | End: 2021-06-01
Attending: EMERGENCY MEDICINE
Payer: COMMERCIAL

## 2021-06-01 VITALS
DIASTOLIC BLOOD PRESSURE: 82 MMHG | TEMPERATURE: 99 F | OXYGEN SATURATION: 97 % | RESPIRATION RATE: 17 BRPM | HEART RATE: 76 BPM | SYSTOLIC BLOOD PRESSURE: 126 MMHG

## 2021-06-01 VITALS
WEIGHT: 139.99 LBS | OXYGEN SATURATION: 100 % | SYSTOLIC BLOOD PRESSURE: 144 MMHG | TEMPERATURE: 98 F | RESPIRATION RATE: 17 BRPM | HEART RATE: 93 BPM | DIASTOLIC BLOOD PRESSURE: 89 MMHG | HEIGHT: 60 IN

## 2021-06-01 LAB
ALBUMIN SERPL ELPH-MCNC: 3.6 G/DL — SIGNIFICANT CHANGE UP (ref 3.5–5)
ALP SERPL-CCNC: 65 U/L — SIGNIFICANT CHANGE UP (ref 40–120)
ALT FLD-CCNC: 24 U/L DA — SIGNIFICANT CHANGE UP (ref 10–60)
ANION GAP SERPL CALC-SCNC: 9 MMOL/L — SIGNIFICANT CHANGE UP (ref 5–17)
AST SERPL-CCNC: 18 U/L — SIGNIFICANT CHANGE UP (ref 10–40)
BASOPHILS # BLD AUTO: 0.04 K/UL — SIGNIFICANT CHANGE UP (ref 0–0.2)
BASOPHILS NFR BLD AUTO: 0.7 % — SIGNIFICANT CHANGE UP (ref 0–2)
BILIRUB SERPL-MCNC: 0.6 MG/DL — SIGNIFICANT CHANGE UP (ref 0.2–1.2)
BUN SERPL-MCNC: 13 MG/DL — SIGNIFICANT CHANGE UP (ref 7–18)
CALCIUM SERPL-MCNC: 8.8 MG/DL — SIGNIFICANT CHANGE UP (ref 8.4–10.5)
CHLORIDE SERPL-SCNC: 108 MMOL/L — SIGNIFICANT CHANGE UP (ref 96–108)
CO2 SERPL-SCNC: 23 MMOL/L — SIGNIFICANT CHANGE UP (ref 22–31)
CREAT SERPL-MCNC: 0.78 MG/DL — SIGNIFICANT CHANGE UP (ref 0.5–1.3)
EOSINOPHIL # BLD AUTO: 0.36 K/UL — SIGNIFICANT CHANGE UP (ref 0–0.5)
EOSINOPHIL NFR BLD AUTO: 6.7 % — HIGH (ref 0–6)
GLUCOSE SERPL-MCNC: 98 MG/DL — SIGNIFICANT CHANGE UP (ref 70–99)
HCG SERPL-ACNC: <1 MIU/ML — SIGNIFICANT CHANGE UP
HCT VFR BLD CALC: 43.4 % — SIGNIFICANT CHANGE UP (ref 34.5–45)
HGB BLD-MCNC: 14.7 G/DL — SIGNIFICANT CHANGE UP (ref 11.5–15.5)
IMM GRANULOCYTES NFR BLD AUTO: 0 % — SIGNIFICANT CHANGE UP (ref 0–1.5)
LIDOCAIN IGE QN: 431 U/L — HIGH (ref 73–393)
LYMPHOCYTES # BLD AUTO: 1.95 K/UL — SIGNIFICANT CHANGE UP (ref 1–3.3)
LYMPHOCYTES # BLD AUTO: 36.4 % — SIGNIFICANT CHANGE UP (ref 13–44)
MCHC RBC-ENTMCNC: 31.3 PG — SIGNIFICANT CHANGE UP (ref 27–34)
MCHC RBC-ENTMCNC: 33.9 GM/DL — SIGNIFICANT CHANGE UP (ref 32–36)
MCV RBC AUTO: 92.3 FL — SIGNIFICANT CHANGE UP (ref 80–100)
MONOCYTES # BLD AUTO: 0.42 K/UL — SIGNIFICANT CHANGE UP (ref 0–0.9)
MONOCYTES NFR BLD AUTO: 7.8 % — SIGNIFICANT CHANGE UP (ref 2–14)
NEUTROPHILS # BLD AUTO: 2.59 K/UL — SIGNIFICANT CHANGE UP (ref 1.8–7.4)
NEUTROPHILS NFR BLD AUTO: 48.4 % — SIGNIFICANT CHANGE UP (ref 43–77)
NRBC # BLD: 0 /100 WBCS — SIGNIFICANT CHANGE UP (ref 0–0)
PLATELET # BLD AUTO: 245 K/UL — SIGNIFICANT CHANGE UP (ref 150–400)
POTASSIUM SERPL-MCNC: 3.4 MMOL/L — LOW (ref 3.5–5.3)
POTASSIUM SERPL-SCNC: 3.4 MMOL/L — LOW (ref 3.5–5.3)
PROT SERPL-MCNC: 7.6 G/DL — SIGNIFICANT CHANGE UP (ref 6–8.3)
RBC # BLD: 4.7 M/UL — SIGNIFICANT CHANGE UP (ref 3.8–5.2)
RBC # FLD: 12.4 % — SIGNIFICANT CHANGE UP (ref 10.3–14.5)
SODIUM SERPL-SCNC: 140 MMOL/L — SIGNIFICANT CHANGE UP (ref 135–145)
TROPONIN I SERPL-MCNC: <0.015 NG/ML — SIGNIFICANT CHANGE UP (ref 0–0.04)
WBC # BLD: 5.36 K/UL — SIGNIFICANT CHANGE UP (ref 3.8–10.5)
WBC # FLD AUTO: 5.36 K/UL — SIGNIFICANT CHANGE UP (ref 3.8–10.5)

## 2021-06-01 PROCEDURE — 83690 ASSAY OF LIPASE: CPT

## 2021-06-01 PROCEDURE — 93005 ELECTROCARDIOGRAM TRACING: CPT

## 2021-06-01 PROCEDURE — 99284 EMERGENCY DEPT VISIT MOD MDM: CPT

## 2021-06-01 PROCEDURE — 96375 TX/PRO/DX INJ NEW DRUG ADDON: CPT

## 2021-06-01 PROCEDURE — 84484 ASSAY OF TROPONIN QUANT: CPT

## 2021-06-01 PROCEDURE — 36415 COLL VENOUS BLD VENIPUNCTURE: CPT

## 2021-06-01 PROCEDURE — 71046 X-RAY EXAM CHEST 2 VIEWS: CPT

## 2021-06-01 PROCEDURE — 80053 COMPREHEN METABOLIC PANEL: CPT

## 2021-06-01 PROCEDURE — 85025 COMPLETE CBC W/AUTO DIFF WBC: CPT

## 2021-06-01 PROCEDURE — 99284 EMERGENCY DEPT VISIT MOD MDM: CPT | Mod: 25

## 2021-06-01 PROCEDURE — 94640 AIRWAY INHALATION TREATMENT: CPT

## 2021-06-01 PROCEDURE — 96365 THER/PROPH/DIAG IV INF INIT: CPT

## 2021-06-01 PROCEDURE — 84702 CHORIONIC GONADOTROPIN TEST: CPT

## 2021-06-01 PROCEDURE — 71046 X-RAY EXAM CHEST 2 VIEWS: CPT | Mod: 26

## 2021-06-01 RX ORDER — MAGNESIUM SULFATE 500 MG/ML
1 VIAL (ML) INJECTION ONCE
Refills: 0 | Status: COMPLETED | OUTPATIENT
Start: 2021-06-01 | End: 2021-06-01

## 2021-06-01 RX ORDER — FAMOTIDINE 10 MG/ML
1 INJECTION INTRAVENOUS
Qty: 60 | Refills: 0
Start: 2021-06-01 | End: 2021-06-30

## 2021-06-01 RX ORDER — FAMOTIDINE 10 MG/ML
20 INJECTION INTRAVENOUS ONCE
Refills: 0 | Status: COMPLETED | OUTPATIENT
Start: 2021-06-01 | End: 2021-06-01

## 2021-06-01 RX ORDER — IPRATROPIUM/ALBUTEROL SULFATE 18-103MCG
3 AEROSOL WITH ADAPTER (GRAM) INHALATION ONCE
Refills: 0 | Status: COMPLETED | OUTPATIENT
Start: 2021-06-01 | End: 2021-06-01

## 2021-06-01 RX ORDER — ALBUTEROL 90 UG/1
2 AEROSOL, METERED ORAL
Qty: 1 | Refills: 0
Start: 2021-06-01 | End: 2021-06-30

## 2021-06-01 RX ADMIN — Medication 1 GRAM(S): at 11:06

## 2021-06-01 RX ADMIN — FAMOTIDINE 20 MILLIGRAM(S): 10 INJECTION INTRAVENOUS at 10:20

## 2021-06-01 RX ADMIN — Medication 40 MILLIGRAM(S): at 10:21

## 2021-06-01 RX ADMIN — Medication 100 GRAM(S): at 10:20

## 2021-06-01 RX ADMIN — Medication 3 MILLILITER(S): at 10:20

## 2021-06-01 RX ADMIN — Medication 30 MILLILITER(S): at 10:20

## 2021-06-01 NOTE — ED PROVIDER NOTE - OBJECTIVE STATEMENT
46 y.o female with a PMhx of GERD ,PCOS , asthma and Hashimoto Diversities , presents to the ED c.o midsternal heavy chest discomfort for x4 days with some wheezing and sensation in L arm. Patient also endorses some tiredness with palpitations, usually worse when laying down. Patient endorses she does not smoke. no drugs or alcohol, Patient states she never had a endoscopy . Patient denies any fever, shortness of breath, abdominal pain. or any other acute complaints. NKDA

## 2021-06-01 NOTE — ED PROVIDER NOTE - CLINICAL SUMMARY MEDICAL DECISION MAKING FREE TEXT BOX
Likely asthma / GERD will r/o ACS , PERC negative, unlikely to be infectious with no history or exam to suggest. Will do labs, give nebs, prednisone, GI cocktail , chest x ray and have patient f.u with GI

## 2021-06-01 NOTE — ED PROVIDER NOTE - PROGRESS NOTE DETAILS
Redd: improve. no longer wheezing, speaking in full sentences. cxr clear  Dx gastritis.  rx pepcid, maalox.  avoid spicy, oily, hot foods, NSAIDs, etoh.  see gi and pcp.  asthma given prednisone. left ambulatory

## 2021-06-01 NOTE — ED PROVIDER NOTE - PATIENT PORTAL LINK FT
You can access the FollowMyHealth Patient Portal offered by [facility] by registering at the following website: [email or web address]. By joining [facility]’s FollowMyHealth portal, you will also be able to view your health information using other applications (apps) compatible with our system.

## 2021-06-01 NOTE — ED PROVIDER NOTE - CARE PLAN
Principal Discharge DX:	GERD (gastroesophageal reflux disease)  Secondary Diagnosis:	Asthma exacerbation

## 2021-07-15 ENCOUNTER — APPOINTMENT (OUTPATIENT)
Dept: GASTROENTEROLOGY | Facility: CLINIC | Age: 46
End: 2021-07-15

## 2021-09-30 ENCOUNTER — APPOINTMENT (OUTPATIENT)
Dept: INTERNAL MEDICINE | Facility: CLINIC | Age: 46
End: 2021-09-30
Payer: COMMERCIAL

## 2021-09-30 VITALS
OXYGEN SATURATION: 98 % | WEIGHT: 146 LBS | BODY MASS INDEX: 30.64 KG/M2 | DIASTOLIC BLOOD PRESSURE: 82 MMHG | SYSTOLIC BLOOD PRESSURE: 130 MMHG | HEART RATE: 80 BPM | HEIGHT: 58 IN | TEMPERATURE: 97.8 F

## 2021-09-30 PROCEDURE — G0447 BEHAVIOR COUNSEL OBESITY 15M: CPT | Mod: NC,59

## 2021-09-30 PROCEDURE — 99212 OFFICE O/P EST SF 10 MIN: CPT | Mod: 25

## 2021-09-30 PROCEDURE — 99396 PREV VISIT EST AGE 40-64: CPT

## 2021-10-18 ENCOUNTER — LABORATORY RESULT (OUTPATIENT)
Age: 46
End: 2021-10-18

## 2021-10-18 ENCOUNTER — APPOINTMENT (OUTPATIENT)
Dept: INTERNAL MEDICINE | Facility: CLINIC | Age: 46
End: 2021-10-18
Payer: COMMERCIAL

## 2021-10-18 DIAGNOSIS — Z12.39 ENCOUNTER FOR OTHER SCREENING FOR MALIGNANT NEOPLASM OF BREAST: ICD-10-CM

## 2021-10-18 PROCEDURE — G0009: CPT

## 2021-10-18 PROCEDURE — 99213 OFFICE O/P EST LOW 20 MIN: CPT | Mod: 25

## 2021-10-18 PROCEDURE — 90732 PPSV23 VACC 2 YRS+ SUBQ/IM: CPT

## 2021-10-20 LAB
ALBUMIN SERPL ELPH-MCNC: 4.5 G/DL
ALP BLD-CCNC: 60 U/L
ALT SERPL-CCNC: 16 U/L
ANION GAP SERPL CALC-SCNC: 13 MMOL/L
APPEARANCE: ABNORMAL
AST SERPL-CCNC: 13 U/L
BASOPHILS # BLD AUTO: 0.05 K/UL
BASOPHILS NFR BLD AUTO: 0.6 %
BILIRUB SERPL-MCNC: 0.6 MG/DL
BILIRUBIN URINE: NEGATIVE
BLOOD URINE: NEGATIVE
BUN SERPL-MCNC: 9 MG/DL
CALCIUM SERPL-MCNC: 9.2 MG/DL
CHLORIDE SERPL-SCNC: 104 MMOL/L
CHOLEST SERPL-MCNC: 164 MG/DL
CO2 SERPL-SCNC: 22 MMOL/L
COLOR: NORMAL
CREAT SERPL-MCNC: 0.71 MG/DL
EOSINOPHIL # BLD AUTO: 0.24 K/UL
EOSINOPHIL NFR BLD AUTO: 2.8 %
ESTIMATED AVERAGE GLUCOSE: 120 MG/DL
GLUCOSE QUALITATIVE U: NEGATIVE
GLUCOSE SERPL-MCNC: 77 MG/DL
HBA1C MFR BLD HPLC: 5.8 %
HCT VFR BLD CALC: 48.1 %
HDLC SERPL-MCNC: 61 MG/DL
HGB BLD-MCNC: 15.1 G/DL
IMM GRANULOCYTES NFR BLD AUTO: 0.2 %
KETONES URINE: NEGATIVE
LDLC SERPL CALC-MCNC: 79 MG/DL
LEUKOCYTE ESTERASE URINE: ABNORMAL
LYMPHOCYTES # BLD AUTO: 1.91 K/UL
LYMPHOCYTES NFR BLD AUTO: 22.3 %
MAN DIFF?: NORMAL
MCHC RBC-ENTMCNC: 31.4 GM/DL
MCHC RBC-ENTMCNC: 31.4 PG
MCV RBC AUTO: 100 FL
MONOCYTES # BLD AUTO: 0.82 K/UL
MONOCYTES NFR BLD AUTO: 9.6 %
NEUTROPHILS # BLD AUTO: 5.54 K/UL
NEUTROPHILS NFR BLD AUTO: 64.5 %
NITRITE URINE: NEGATIVE
NONHDLC SERPL-MCNC: 104 MG/DL
PH URINE: 7.5
PLATELET # BLD AUTO: 267 K/UL
POTASSIUM SERPL-SCNC: 4.1 MMOL/L
PROT SERPL-MCNC: 7.2 G/DL
PROTEIN URINE: NEGATIVE
RBC # BLD: 4.81 M/UL
RBC # FLD: 13.6 %
SODIUM SERPL-SCNC: 139 MMOL/L
SPECIFIC GRAVITY URINE: 1.01
T4 FREE SERPL-MCNC: 1.3 NG/DL
THYROGLOB AB SERPL-ACNC: 1431 IU/ML
THYROPEROXIDASE AB SERPL IA-ACNC: 1690 IU/ML
TRIGL SERPL-MCNC: 122 MG/DL
TSH SERPL-ACNC: 1.09 UIU/ML
UROBILINOGEN URINE: NORMAL
WBC # FLD AUTO: 8.58 K/UL

## 2021-10-25 ENCOUNTER — RX RENEWAL (OUTPATIENT)
Age: 46
End: 2021-10-25

## 2021-11-30 ENCOUNTER — APPOINTMENT (OUTPATIENT)
Dept: UROLOGY | Facility: CLINIC | Age: 46
End: 2021-11-30
Payer: COMMERCIAL

## 2021-11-30 VITALS — DIASTOLIC BLOOD PRESSURE: 86 MMHG | TEMPERATURE: 97.9 F | OXYGEN SATURATION: 73 % | SYSTOLIC BLOOD PRESSURE: 138 MMHG

## 2021-11-30 DIAGNOSIS — N39.0 URINARY TRACT INFECTION, SITE NOT SPECIFIED: ICD-10-CM

## 2021-11-30 LAB
BILIRUB UR QL STRIP: NORMAL
GLUCOSE UR-MCNC: NORMAL
HCG UR QL: 1 EU/DL
HGB UR QL STRIP.AUTO: NORMAL
KETONES UR-MCNC: NORMAL
LEUKOCYTE ESTERASE UR QL STRIP: NORMAL
NITRITE UR QL STRIP: NORMAL
PH UR STRIP: 6
PROT UR STRIP-MCNC: NORMAL
SP GR UR STRIP: 1.03

## 2021-11-30 PROCEDURE — 99204 OFFICE O/P NEW MOD 45 MIN: CPT

## 2021-11-30 PROCEDURE — 51798 US URINE CAPACITY MEASURE: CPT

## 2021-11-30 PROCEDURE — 81003 URINALYSIS AUTO W/O SCOPE: CPT | Mod: QW

## 2021-11-30 NOTE — REVIEW OF SYSTEMS
[see HPI] : see HPI [Eyesight Problems] : eyesight problems [Dry Eyes] : dryness of the eyes [Constipation] : constipation [Joint Pain] : joint pain [Joint Swelling] : joint swelling [Negative] : ENT

## 2021-12-01 LAB
APPEARANCE: CLEAR
BACTERIA: ABNORMAL
BILIRUBIN URINE: NEGATIVE
BLOOD URINE: ABNORMAL
COLOR: YELLOW
GLUCOSE QUALITATIVE U: NEGATIVE
HYALINE CASTS: 0 /LPF
KETONES URINE: NEGATIVE
LEUKOCYTE ESTERASE URINE: ABNORMAL
MICROSCOPIC-UA: NORMAL
NITRITE URINE: NEGATIVE
PH URINE: 6
PROTEIN URINE: NORMAL
RED BLOOD CELLS URINE: 3 /HPF
SPECIFIC GRAVITY URINE: 1.03
SQUAMOUS EPITHELIAL CELLS: 8 /HPF
UROBILINOGEN URINE: NORMAL
WHITE BLOOD CELLS URINE: 14 /HPF

## 2021-12-03 LAB — BACTERIA UR CULT: NORMAL

## 2021-12-06 ENCOUNTER — NON-APPOINTMENT (OUTPATIENT)
Age: 46
End: 2021-12-06

## 2021-12-06 LAB — URINE CYTOLOGY: NORMAL

## 2021-12-13 NOTE — ADDENDUM
[FreeTextEntry1] : A portion of this note was written by [Consuelo Salvador] on 11/30/2021 acting as a scribe for Dr. Styles. \par \par I have personally reviewed the chart and agree that the record accurately reflects my personal performance of the history, physical exam, assessment, and plan.

## 2021-12-13 NOTE — LETTER BODY
[Dear  ___] : Dear  [unfilled], [Consult Letter:] : I had the pleasure of evaluating your patient, [unfilled]. [Please see my note below.] : Please see my note below. [Consult Closing:] : Thank you very much for allowing me to participate in the care of this patient.  If you have any questions, please do not hesitate to contact me. [Sincerely,] : Sincerely, [FreeTextEntry3] : Dr. Marichuy Styles

## 2021-12-13 NOTE — PHYSICAL EXAM

## 2021-12-13 NOTE — HISTORY OF PRESENT ILLNESS
[FreeTextEntry1] : Pt is a 45 yo female  with a hx of UTIs, (usually occur at end of menstrual cycle) presenting today with WBC's on UA at Dr. Guido's office. She reports stress incontinence that's been persistent for years, but isn't terribly bothered by this.   She reports typically gets 2 UTIs per year. Denies recent/current  UTIs, hematuria, f/c, and flank pain. \par Sexually active. -no dyspareunia \par \par Udip: (+) small blood, small valdemar.\par PVR: 12 cc (to rule out incomplete bladder emptying)\par \par PMH: asthma, depression, dysphagia, hashimoto's thyroiditis, HLD, LPRD, PCOS, scoliosis, thyromegaly, tonsillar hypertrophy\par PSH: arthroscopy \par FH: no  malignancies, urolithiasis (mother's side), colon CA (father), HTN (mother, grandmother, grandfather), liver CA (grandmother)\par SH: non-smoker, no alcohol,  \par \par \par Allergies: NKDA \par Meds: albuterol, atorvastatin, breo ellipta, omeprazole, prednisone, cetirizine, clenpiq

## 2021-12-13 NOTE — ASSESSMENT
[FreeTextEntry1] : Pt is a 45 yo F with WBC's found on UA at Dr. Grant's office. I have sent today's urine for culture, cytology, and  UA. I will be in touch with results if RBC's are evident. Provided urine results are negative, no further intervention is warranted. \par \par Patient expressed understanding.

## 2022-01-10 ENCOUNTER — APPOINTMENT (OUTPATIENT)
Dept: UROLOGY | Facility: CLINIC | Age: 47
End: 2022-01-10
Payer: COMMERCIAL

## 2022-01-10 VITALS
TEMPERATURE: 98 F | HEART RATE: 82 BPM | OXYGEN SATURATION: 99 % | DIASTOLIC BLOOD PRESSURE: 87 MMHG | SYSTOLIC BLOOD PRESSURE: 130 MMHG

## 2022-01-10 LAB
BILIRUB UR QL STRIP: NORMAL
CLARITY UR: CLEAR
COLLECTION METHOD: NORMAL
GLUCOSE UR-MCNC: 100
HCG UR QL: 0.2 EU/DL
HGB UR QL STRIP.AUTO: NORMAL
KETONES UR-MCNC: NORMAL
LEUKOCYTE ESTERASE UR QL STRIP: NORMAL
NITRITE UR QL STRIP: NORMAL
PH UR STRIP: 5
PROT UR STRIP-MCNC: NORMAL
SP GR UR STRIP: >=1.03

## 2022-01-10 PROCEDURE — 81003 URINALYSIS AUTO W/O SCOPE: CPT | Mod: QW

## 2022-01-10 PROCEDURE — 52000 CYSTOURETHROSCOPY: CPT

## 2022-01-10 PROCEDURE — 99214 OFFICE O/P EST MOD 30 MIN: CPT | Mod: 25

## 2022-01-10 NOTE — ASSESSMENT
[FreeTextEntry1] : Pt is a 47 yo F with WBC's found on UA at Dr. Grant's office. She returned today for a cystoscopy for further evaluation as recent UA was positive for RBC's. Cystoscopy was normal and well tolerated. She will f/u for a CT scan and schedule a telehealth visit to discuss results. Provided that results are negative no further intervention is warranted. \par \par Patient expressed understanding. \par

## 2022-01-10 NOTE — REVIEW OF SYSTEMS
[Eyesight Problems] : eyesight problems [Dry Eyes] : dryness of the eyes [Constipation] : constipation [see HPI] : see HPI [Joint Pain] : joint pain [Joint Swelling] : joint swelling [Negative] : Heme/Lymph

## 2022-01-10 NOTE — ADDENDUM
[FreeTextEntry1] : A portion of this note was written by [Consuelo Salvador] on 01/10/2022 acting as a scribe for Dr. Styles. \par \par I have personally reviewed the chart and agree that the record accurately reflects my personal performance of the history, physical exam, assessment, and plan.

## 2022-01-10 NOTE — HISTORY OF PRESENT ILLNESS
[FreeTextEntry1] : Pt is a 45 yo female  with a hx of UTIs, (usually occur at end of menstrual cycle). She returns today for a cystoscopy for further evaluation as recent UA was positive for RBC's. \par \par Recent hx: She is presenting today with WBC's on UA at Dr. Guido's office. She reports stress incontinence that's been persistent for years, but isn't terribly bothered by this.   She reports typically gets 2 UTIs per year. Denies recent/current  UTIs, hematuria, f/c, and flank pain. \par Sexually active. -no dyspareunia \par \par Udip: (+) small blood, trace valdemar.\par PVR: 12 cc (to rule out incomplete bladder emptying)\par \par PMH: asthma, depression, dysphagia, hashimoto's thyroiditis, HLD, LPRD, PCOS, scoliosis, thyromegaly, tonsillar hypertrophy\par PSH: arthroscopy \par FH: no  malignancies, urolithiasis (mother's side), colon CA (father), HTN (mother, grandmother, grandfather), liver CA (grandmother)\par SH: non-smoker, no alcohol,  \par \par Allergies: NKDA \par Meds: albuterol, atorvastatin, breo ellipta, omeprazole, prednisone, cetirizine, clenpiq

## 2022-01-12 LAB — BACTERIA UR CULT: NORMAL

## 2022-01-25 ENCOUNTER — NON-APPOINTMENT (OUTPATIENT)
Age: 47
End: 2022-01-25

## 2022-02-07 ENCOUNTER — RX RENEWAL (OUTPATIENT)
Age: 47
End: 2022-02-07

## 2022-04-05 ENCOUNTER — APPOINTMENT (OUTPATIENT)
Dept: ENDOCRINOLOGY | Facility: CLINIC | Age: 47
End: 2022-04-05
Payer: COMMERCIAL

## 2022-04-05 VITALS
DIASTOLIC BLOOD PRESSURE: 91 MMHG | BODY MASS INDEX: 30.31 KG/M2 | WEIGHT: 145 LBS | SYSTOLIC BLOOD PRESSURE: 154 MMHG | HEART RATE: 84 BPM

## 2022-04-05 PROCEDURE — 99214 OFFICE O/P EST MOD 30 MIN: CPT

## 2022-04-05 RX ORDER — OMEPRAZOLE 40 MG/1
40 CAPSULE, DELAYED RELEASE ORAL
Qty: 30 | Refills: 1 | Status: DISCONTINUED | COMMUNITY
Start: 2021-04-09 | End: 2022-04-05

## 2022-04-05 RX ORDER — FLUTICASONE FUROATE AND VILANTEROL TRIFENATATE 200; 25 UG/1; UG/1
200-25 POWDER RESPIRATORY (INHALATION) DAILY
Qty: 60 | Refills: 3 | Status: DISCONTINUED | COMMUNITY
Start: 2021-09-30 | End: 2022-04-05

## 2022-04-05 NOTE — PHYSICAL EXAM
[Alert] : alert [Healthy Appearance] : healthy appearance [No Acute Distress] : no acute distress [Normal Sclera/Conjunctiva] : normal sclera/conjunctiva [Normal Hearing] : hearing was normal [No Respiratory Distress] : no respiratory distress [Kyphosis] : no kyphosis present [No Stigmata of Cushings Syndrome] : no stigmata of Cushings Syndrome [Normal Gait] : normal gait [Acanthosis Nigricans] : no acanthosis nigricans [Normal Insight/Judgement] : insight and judgment were intact [de-identified] : no moon facies, no supraclavicular fat pads

## 2022-04-05 NOTE — ASSESSMENT
[FreeTextEntry1] : Polycystic ovarian syndrome. Elevated body mass index. Elevated hemoglobin A1c. She met criteria for polycystic ovarian syndrome in the setting of oligomenorrhea and clinical evidence of hyperandrogenism. We discussed the potential risks of polycystic ovarian syndrome, including but not limited to endometrial hyperplasia/cancer, issues with androgen excess, metabolic effects/insulin resistance. We discussed that first line therapy for polycystic ovarian syndrome is a combined oral contraceptive pill. She has preferred to remain off an oral contraceptive pill for now. She is having menstrual bleeding every month and hirsutism/acne not currently of concern. We have discussed use of progesterone as needed if lack of withdrawal bleeding for 90 days. We discussed that weight loss should improve her symptoms. We reviewed lifestyle modification for weight loss. Insurance has not covered visits with nutrition. We discussed pharmacologic options for weight loss. She has not tolerated metformin, bupropion, or phentermine. She is amenable to a trial of topiramate. We discussed the risks and benefits of topiramate, including but not limited to paresthesias, fatigue, drowsiness, dizziness, memory impairment. \par Lifestyle modification\par Start topiramate 25 mg daily for one week, then 25 mg twice daily\par \par Euthyroid Hashimoto's disease. She has a history of positive thyroid antibodies. She has been biochemically euthyroid.\par Monitor TSH annually or earlier if symptoms\par \par Vitamin D deficiency. \par Restart ergocalciferol 50,000 intl units every two weeks\par \par Neck pain. I referred her to otorhinolaryngology. \par \par Return to see me in 3 months.

## 2022-04-05 NOTE — HISTORY OF PRESENT ILLNESS
[FreeTextEntry1] : Ms. Hale is a 47 year-old woman presenting for follow-up of her endocrine issues. I saw her for an initial visit in December 2018 and last in November 2020; she is a former patient of Dr. Jany Bui.\par \par Polycystic ovarian syndrome. Elevated body mass index. Elevated HbA1c. Hemoglobin A1c 5.8% in October 2021.\par She was diagnosed with polycystic ovarian syndrome in her twenties in the setting of oligomenorrhea, acne, and hirsutism. \par She was previously on a combined oral contraceptive pill, off since around March 2017. She has had withdrawal bleeding every month, although periods are not completely regular.\par Acne and hirsutism currently under good control.\par Her early adult weight was 90 pounds, gradually increased up to 145 pounds. She was down to 115 pounds following a very restrictive diet. She was down to 135 pounds while on bupropion and metformin. \par She had mood issues on bupropion. She had forgetfulness with metformin. She had palpitations with phentermine.\par \par Euthyroid Hashimoto's disease.\par She has a history of positive thyroid antibodies. \par She has been biochemically euthyroid.\par \par Vitamin D deficiency.\par She was taking ergocalciferol 50,000 intl units every two weeks; currently off.\par \par Interim History \par She has seen multiple providers; notes reviewed. Last laboratory results reviewed. TSH within range. HbA1c 5.8%.\par She has been intermittent fasting, 16:8.\par She has occasional neck pain radiating to her ear.\par She is having monthly menstrual bleeding.\par Medical and surgical history, medications, allergies, social and family history reviewed and updated as needed.

## 2022-04-11 PROBLEM — Z11.59 SCREENING FOR VIRAL DISEASE: Status: ACTIVE | Noted: 2020-08-24

## 2022-05-10 ENCOUNTER — APPOINTMENT (OUTPATIENT)
Dept: OTOLARYNGOLOGY | Facility: CLINIC | Age: 47
End: 2022-05-10
Payer: COMMERCIAL

## 2022-05-10 VITALS — WEIGHT: 145 LBS | HEIGHT: 58 IN | TEMPERATURE: 97.2 F | BODY MASS INDEX: 30.44 KG/M2

## 2022-05-10 DIAGNOSIS — R22.0 LOCALIZED SWELLING, MASS AND LUMP, HEAD: ICD-10-CM

## 2022-05-10 DIAGNOSIS — R22.1 LOCALIZED SWELLING, MASS AND LUMP, HEAD: ICD-10-CM

## 2022-05-10 DIAGNOSIS — R19.8 OTHER SPECIFIED SYMPTOMS AND SIGNS INVOLVING THE DIGESTIVE SYSTEM AND ABDOMEN: ICD-10-CM

## 2022-05-10 PROCEDURE — 99213 OFFICE O/P EST LOW 20 MIN: CPT | Mod: 25

## 2022-05-10 PROCEDURE — 31575 DIAGNOSTIC LARYNGOSCOPY: CPT

## 2022-05-10 NOTE — HISTORY OF PRESENT ILLNESS
[de-identified] : KEV BLUNT is a 47 year old patient here for a sensation of neck swelling, ear discomfort, and a wheezing noise when laying down.  She had similar symptoms last year.  She had a work-up including CT scan of the neck and sleep study.  She had been doing better.  She said 1 month ago, she was using polish at her job.  She felt like she developed symptoms following that.  She has no dysphagia or voice change.  She did see the gastroenterologist.  She is not currently on medication for reflux.  She has no nasal or sinus symptoms.\par \par ENT history\par She has a history of symptoms consistent with reflux.  She saw a gastroenterologist and had an upper endoscopy by report\par CT scan of the neck 2021–no masses.  There is a small salivary calcification in the left superficial and superior parotid gland about 6 mm in size without ductal dilation.  The thyroid was slightly enlarged symmetrically and heterogeneous without dominant mass or lesions.  Sinuses were reportedly clear except for minimal right posterior ethmoid opacification.  There was a small left middle turbinate tariq bullosa and a deviated septum\par Sleep study was negative for sleep apnea

## 2022-05-10 NOTE — ASSESSMENT
[FreeTextEntry1] : She has a recurrent globus sensation and sensation of neck swelling.  She also again reports a wheezing noise at night when she lays down.  She has no sinus symptoms.  On exam, she had mild nasal mucosal edema, deviated septum, and mild reflux related laryngeal changes.  I did not palpate an obvious mass or swelling on exam.  She does have a history of an enlarged thyroid\par \par She has bilateral mild ear discomfort and pressure.  Her ears were normal on exam.  She does have crepitus and TMJ tenderness.  That is likely causing the symptoms\par \par PLAN\par \par -findings and management options discussed in detail with the patient. \par -Good oral hygiene\par -Dental evaluation for TMJ dysfunction.  She was given literature\par -Reflux precautions and a trial of Pepcid for 2 to 3 weeks\par -Consider pH probe testing if her symptoms persist\par -Nasal steroid spray, nasal saline rinses and antihistamine/decongestant as needed\par -Allergy evaluation\par -She is concerned about possible neck swelling and would like to go for an ultrasound of the neck\par -I would like to see her back after the allergy evaluation.  I asked her to call for the ultrasound results\par - call and return earlier if any concerns or worsening symptoms

## 2022-05-17 ENCOUNTER — APPOINTMENT (OUTPATIENT)
Dept: INTERNAL MEDICINE | Facility: CLINIC | Age: 47
End: 2022-05-17
Payer: COMMERCIAL

## 2022-05-17 PROCEDURE — 99442: CPT

## 2022-06-27 ENCOUNTER — APPOINTMENT (OUTPATIENT)
Dept: INTERNAL MEDICINE | Facility: CLINIC | Age: 47
End: 2022-06-27
Payer: COMMERCIAL

## 2022-06-27 VITALS
HEIGHT: 58 IN | OXYGEN SATURATION: 99 % | BODY MASS INDEX: 29.39 KG/M2 | DIASTOLIC BLOOD PRESSURE: 82 MMHG | TEMPERATURE: 97.8 F | WEIGHT: 140 LBS | HEART RATE: 91 BPM | SYSTOLIC BLOOD PRESSURE: 120 MMHG

## 2022-06-27 PROCEDURE — 99214 OFFICE O/P EST MOD 30 MIN: CPT

## 2022-07-12 ENCOUNTER — APPOINTMENT (OUTPATIENT)
Dept: INTERNAL MEDICINE | Facility: CLINIC | Age: 47
End: 2022-07-12

## 2022-07-12 VITALS
DIASTOLIC BLOOD PRESSURE: 74 MMHG | BODY MASS INDEX: 30.23 KG/M2 | SYSTOLIC BLOOD PRESSURE: 118 MMHG | WEIGHT: 144 LBS | TEMPERATURE: 98.3 F | HEART RATE: 92 BPM | OXYGEN SATURATION: 98 % | HEIGHT: 58 IN

## 2022-07-12 PROCEDURE — 99213 OFFICE O/P EST LOW 20 MIN: CPT

## 2022-07-18 ENCOUNTER — APPOINTMENT (OUTPATIENT)
Dept: ENDOCRINOLOGY | Facility: CLINIC | Age: 47
End: 2022-07-18

## 2022-09-02 ENCOUNTER — APPOINTMENT (OUTPATIENT)
Dept: INTERNAL MEDICINE | Facility: CLINIC | Age: 47
End: 2022-09-02

## 2022-09-02 VITALS
WEIGHT: 146.5 LBS | HEART RATE: 70 BPM | DIASTOLIC BLOOD PRESSURE: 74 MMHG | TEMPERATURE: 97.8 F | SYSTOLIC BLOOD PRESSURE: 117 MMHG | HEIGHT: 58 IN | BODY MASS INDEX: 30.75 KG/M2 | OXYGEN SATURATION: 98 %

## 2022-09-02 DIAGNOSIS — M65.351 TRIGGER FINGER, RIGHT LITTLE FINGER: ICD-10-CM

## 2022-09-02 DIAGNOSIS — M20.011 MALLET FINGER OF RIGHT FINGER(S): ICD-10-CM

## 2022-09-02 PROCEDURE — 99214 OFFICE O/P EST MOD 30 MIN: CPT

## 2022-09-03 PROBLEM — M20.011 MALLET FINGER OF RIGHT HAND: Status: ACTIVE | Noted: 2022-09-03

## 2022-11-10 ENCOUNTER — APPOINTMENT (OUTPATIENT)
Dept: ORTHOPEDIC SURGERY | Facility: CLINIC | Age: 47
End: 2022-11-10

## 2022-11-22 ENCOUNTER — APPOINTMENT (OUTPATIENT)
Dept: ORTHOPEDIC SURGERY | Facility: CLINIC | Age: 47
End: 2022-11-22
Payer: COMMERCIAL

## 2022-11-22 DIAGNOSIS — M25.532 PAIN IN LEFT WRIST: ICD-10-CM

## 2022-11-22 PROCEDURE — 73140 X-RAY EXAM OF FINGER(S): CPT | Mod: LT

## 2022-11-22 PROCEDURE — 99213 OFFICE O/P EST LOW 20 MIN: CPT

## 2022-11-22 NOTE — HISTORY OF PRESENT ILLNESS
[9] : 9 [de-identified] : 11/22/22: 48yo RHD F () presents for LEFT thumb pain that radiates up to her LEFT chest wall x 6 months. Pain is constant. Denies numbness/tingling.\par \par Hx: PCOS. Hashimoto's thyroiditis. high cholesterol. [FreeTextEntry5] : NP 47 year old f presenting with left thumb pain states pain radiates to the wrist and arm, has been experiencing the pain for about 6 months now denies any injuries or trauma to the finger. states the pain is constant, sharp, dull/aching pain experiencing stiffness in the morning. is unable to apply pressure to the finger. Pt is right handed

## 2022-11-22 NOTE — ASSESSMENT
[FreeTextEntry1] : The condition was explained to the patient.\par - recommend wrist brace, to be worn full time except for hygiene.\par - recommend activity modification and ice as needed.\par - recommend Voltaren gel TID.\par \par F/u 2wks.\par Recommend f/u with PCP regarding chest wall pain. Also consider autoimmune/inflammatory workup.

## 2022-11-22 NOTE — IMAGING
[de-identified] : LEFT HAND\par skin intact. no swelling.\par TTP to volar STTJ.\par good wrist extension, flexion. good pronation, supination.\par good EPL, FPL. good finger extension, flex to full fist. good finger abduction and adduction. \par SILT to median, ulnar, radial distribution. \par palpable radial pulse, brisk cap refill all digits.\par no triggering.\par \par \par XR LEFT THUMB: no acute displaced fracture or dislocation.

## 2022-12-06 ENCOUNTER — APPOINTMENT (OUTPATIENT)
Dept: ORTHOPEDIC SURGERY | Facility: CLINIC | Age: 47
End: 2022-12-06

## 2023-01-26 ENCOUNTER — APPOINTMENT (OUTPATIENT)
Dept: INTERNAL MEDICINE | Facility: CLINIC | Age: 48
End: 2023-01-26
Payer: COMMERCIAL

## 2023-03-10 ENCOUNTER — APPOINTMENT (OUTPATIENT)
Dept: INTERNAL MEDICINE | Facility: CLINIC | Age: 48
End: 2023-03-10
Payer: COMMERCIAL

## 2023-03-10 VITALS
BODY MASS INDEX: 30.86 KG/M2 | SYSTOLIC BLOOD PRESSURE: 119 MMHG | HEIGHT: 58 IN | DIASTOLIC BLOOD PRESSURE: 79 MMHG | WEIGHT: 147 LBS | HEART RATE: 75 BPM | TEMPERATURE: 98.2 F | OXYGEN SATURATION: 98 %

## 2023-03-10 DIAGNOSIS — R07.89 OTHER CHEST PAIN: ICD-10-CM

## 2023-03-10 DIAGNOSIS — E78.5 HYPERLIPIDEMIA, UNSPECIFIED: ICD-10-CM

## 2023-03-10 PROCEDURE — 99213 OFFICE O/P EST LOW 20 MIN: CPT | Mod: 25

## 2023-03-10 PROCEDURE — 36415 COLL VENOUS BLD VENIPUNCTURE: CPT

## 2023-03-10 RX ORDER — FLUTICASONE FUROATE AND VILANTEROL TRIFENATATE 100; 25 UG/1; UG/1
100-25 POWDER RESPIRATORY (INHALATION) DAILY
Qty: 1 | Refills: 5 | Status: DISCONTINUED | COMMUNITY
Start: 2022-07-13 | End: 2023-03-10

## 2023-03-10 RX ORDER — NIRMATRELVIR AND RITONAVIR 300-100 MG
20 X 150 MG & KIT ORAL
Qty: 1 | Refills: 0 | Status: DISCONTINUED | COMMUNITY
Start: 2022-05-17 | End: 2023-03-10

## 2023-03-10 NOTE — ASSESSMENT
[FreeTextEntry1] : 1.)  Asthma exacerbation\par In remission for several weeks after treatment with 7-day course of prednisone 40 mg.\par Patient advised to increase Symbicort use to twice daily rather than once daily for at least the next 3 months to protect against recurrent wheezing.  If she remains wheeze-free , she may then reduce frequency to once daily if she still wants to (although 1 puff twice daily would be ideal).\par \par #2) Chest wall pain\par Has remitted for several months.\par No further evaluation or specific treatment recommended at this time.

## 2023-03-10 NOTE — HISTORY OF PRESENT ILLNESS
-- Message is from the [facility]--      Patient is requesting a medication refill - medication is on active list    Was Medication Pended? Yes.    Rx Name and Dose:  fentaNYL (DURAGESIC) 100 MCG/HR patch  HYDROcodone-acetaminophen (NORCO)  MG per tablet    Duration: 30 days    Pharmacy  [id number] - [place], [place] - [address]    Patient confirmed the above pharmacy as correct?  Yes    Caller Information       Type Contact Phone    05/09/2019 04:12 PM Phone (Incoming) Delilah Jim (Self) [phone] (H)          Alternative phone number: none    Turnaround time given to caller:   \"This message will be sent to [state Provider's name]. The clinical team will fulfill your request as soon as they review your message when the office opens tomorrow.\"   [FreeTextEntry1] : Asthma\par Chest wall discomfort [de-identified] : Patient returns for reassessment of asthma and chest wall pain.\par She states that she completed a another 7-day course of prednisone 40 mg as prescribed by phone 1 month ago.  Since then her asthma has been in complete remission.  She has been taking Symbicort 80 mcg 1 puff daily and has not required albuterol use.\par She also requests generalized blood work as has not been done now for 18 months\par On questioning, she states that prior complaint of chest wall pain has resolved spontaneously without recurrence for at least the past 2 months.

## 2023-03-13 LAB
25(OH)D3 SERPL-MCNC: 52.2 NG/ML
ALBUMIN SERPL ELPH-MCNC: 4.3 G/DL
ALP BLD-CCNC: 61 U/L
ALT SERPL-CCNC: 17 U/L
ANION GAP SERPL CALC-SCNC: 14 MMOL/L
AST SERPL-CCNC: 20 U/L
BASOPHILS # BLD AUTO: 0.04 K/UL
BASOPHILS NFR BLD AUTO: 0.5 %
BILIRUB SERPL-MCNC: 0.4 MG/DL
BUN SERPL-MCNC: 10 MG/DL
CALCIUM SERPL-MCNC: 9.3 MG/DL
CHLORIDE SERPL-SCNC: 103 MMOL/L
CHOLEST SERPL-MCNC: 194 MG/DL
CO2 SERPL-SCNC: 21 MMOL/L
CREAT SERPL-MCNC: 0.71 MG/DL
EGFR: 106 ML/MIN/1.73M2
EOSINOPHIL # BLD AUTO: 0.29 K/UL
EOSINOPHIL NFR BLD AUTO: 3.4 %
ESTIMATED AVERAGE GLUCOSE: 120 MG/DL
GLUCOSE SERPL-MCNC: 92 MG/DL
HBA1C MFR BLD HPLC: 5.8 %
HCT VFR BLD CALC: 44.4 %
HDLC SERPL-MCNC: 63 MG/DL
HGB BLD-MCNC: 14.5 G/DL
IMM GRANULOCYTES NFR BLD AUTO: 0.2 %
LDLC SERPL CALC-MCNC: 116 MG/DL
LYMPHOCYTES # BLD AUTO: 2.45 K/UL
LYMPHOCYTES NFR BLD AUTO: 29.1 %
MAN DIFF?: NORMAL
MCHC RBC-ENTMCNC: 31.9 PG
MCHC RBC-ENTMCNC: 32.7 GM/DL
MCV RBC AUTO: 97.8 FL
MONOCYTES # BLD AUTO: 0.7 K/UL
MONOCYTES NFR BLD AUTO: 8.3 %
NEUTROPHILS # BLD AUTO: 4.91 K/UL
NEUTROPHILS NFR BLD AUTO: 58.5 %
NONHDLC SERPL-MCNC: 131 MG/DL
PLATELET # BLD AUTO: 285 K/UL
POTASSIUM SERPL-SCNC: 3.3 MMOL/L
PROT SERPL-MCNC: 7.1 G/DL
RBC # BLD: 4.54 M/UL
RBC # FLD: 13.8 %
SODIUM SERPL-SCNC: 139 MMOL/L
TRIGL SERPL-MCNC: 71 MG/DL
TSH SERPL-ACNC: 0.74 UIU/ML
WBC # FLD AUTO: 8.41 K/UL

## 2023-03-17 ENCOUNTER — APPOINTMENT (OUTPATIENT)
Dept: INTERNAL MEDICINE | Facility: CLINIC | Age: 48
End: 2023-03-17
Payer: COMMERCIAL

## 2023-03-17 DIAGNOSIS — E87.6 HYPOKALEMIA: ICD-10-CM

## 2023-03-17 PROCEDURE — 36415 COLL VENOUS BLD VENIPUNCTURE: CPT

## 2023-03-20 LAB
ANION GAP SERPL CALC-SCNC: 13 MMOL/L
CHLORIDE SERPL-SCNC: 103 MMOL/L
CO2 SERPL-SCNC: 24 MMOL/L
MAGNESIUM SERPL-MCNC: 2.1 MG/DL
POTASSIUM SERPL-SCNC: 3.8 MMOL/L
SODIUM SERPL-SCNC: 140 MMOL/L

## 2023-07-10 ENCOUNTER — APPOINTMENT (OUTPATIENT)
Dept: INTERNAL MEDICINE | Facility: CLINIC | Age: 48
End: 2023-07-10

## 2023-08-29 ENCOUNTER — APPOINTMENT (OUTPATIENT)
Dept: OTOLARYNGOLOGY | Facility: CLINIC | Age: 48
End: 2023-08-29
Payer: COMMERCIAL

## 2023-08-29 DIAGNOSIS — J34.2 DEVIATED NASAL SEPTUM: ICD-10-CM

## 2023-08-29 PROCEDURE — 99213 OFFICE O/P EST LOW 20 MIN: CPT | Mod: 25

## 2023-08-29 PROCEDURE — 31575 DIAGNOSTIC LARYNGOSCOPY: CPT

## 2023-08-29 RX ORDER — FAMOTIDINE 20 MG/1
20 TABLET, FILM COATED ORAL
Qty: 200 | Refills: 2 | Status: ACTIVE | COMMUNITY
Start: 2023-08-29 | End: 1900-01-01

## 2023-08-29 RX ORDER — FLUTICASONE PROPIONATE AND SALMETEROL 100; 50 UG/1; UG/1
100-50 POWDER RESPIRATORY (INHALATION)
Qty: 1 | Refills: 1 | Status: DISCONTINUED | COMMUNITY
Start: 2022-06-21 | End: 2023-08-29

## 2023-08-29 NOTE — HISTORY OF PRESENT ILLNESS
[de-identified] : KEV BLUNT is a 48 year old patient Here for recurrent globus sensation.  She said that when she lays down, she has a wheezing in her throat and congestion in her nose.  She has occasional discomfort with eating but no dysphagia, voice change, or bleeding.  She is not currently on medication for reflux.  She has not used any nasal or sinus medications.   ENT history  She has reflux. She saw a gastroenterologist and had an upper endoscopy by report in the past  CT scan of the neck 2021-no masses. There is a small salivary calcification in the left superficial and superior parotid gland about 6 mm in size without ductal dilation. The thyroid was slightly enlarged symmetrically and heterogeneous without dominant mass or lesions. Sinuses were reportedly clear except for minimal right posterior ethmoid opacification. There was a small left middle turbinate tariq bullosa and a deviated septum  Sleep study was negative for sleep apnea  She has allergies.  She had testing in the past She has no pets at home She does not smoke

## 2023-08-29 NOTE — ASSESSMENT
[FreeTextEntry1] : She has had recurrent globus sensation with mild odynophagia.  This may be due to reflux exacerbation.  She also has nasal congestion.  There is no obvious sinus infection on exam.  PLAN  -findings and management options discussed in detail with the patient.  -I recommended a nasal steroid spray Nasal saline irrigations and antihistamine or decongestant as needed I recommended reflux precautions.  She was given literature. I recommended trial of Pepcid GI follow-up recommended I recommended she follow-up with her endocrinologist.  Consider repeat thyroid ultrasound I will see her back in 2 to 3 weeks.  We will discuss further work-up if she is not feeling better - call and return earlier if any concerns or worsening symptoms

## 2023-09-22 ENCOUNTER — APPOINTMENT (OUTPATIENT)
Dept: OTOLARYNGOLOGY | Facility: CLINIC | Age: 48
End: 2023-09-22
Payer: COMMERCIAL

## 2023-09-22 DIAGNOSIS — K21.9 GASTRO-ESOPHAGEAL REFLUX DISEASE W/OUT ESOPHAGITIS: ICD-10-CM

## 2023-09-22 DIAGNOSIS — J31.0 CHRONIC RHINITIS: ICD-10-CM

## 2023-09-22 PROCEDURE — 99212 OFFICE O/P EST SF 10 MIN: CPT

## 2023-09-26 ENCOUNTER — RX RENEWAL (OUTPATIENT)
Age: 48
End: 2023-09-26

## 2023-09-26 RX ORDER — ATORVASTATIN CALCIUM 10 MG/1
10 TABLET, FILM COATED ORAL DAILY
Qty: 90 | Refills: 3 | Status: ACTIVE | COMMUNITY
Start: 2020-08-24 | End: 1900-01-01

## 2023-12-21 RX ORDER — ALBUTEROL SULFATE 90 UG/1
108 (90 BASE) INHALANT RESPIRATORY (INHALATION)
Qty: 1 | Refills: 3 | Status: ACTIVE | COMMUNITY
Start: 2020-08-24 | End: 1900-01-01

## 2023-12-28 ENCOUNTER — RX RENEWAL (OUTPATIENT)
Age: 48
End: 2023-12-28

## 2024-02-09 ENCOUNTER — APPOINTMENT (OUTPATIENT)
Dept: ENDOCRINOLOGY | Facility: CLINIC | Age: 49
End: 2024-02-09
Payer: COMMERCIAL

## 2024-02-09 VITALS — HEART RATE: 80 BPM | SYSTOLIC BLOOD PRESSURE: 128 MMHG | DIASTOLIC BLOOD PRESSURE: 86 MMHG

## 2024-02-09 DIAGNOSIS — E28.2 POLYCYSTIC OVARIAN SYNDROME: ICD-10-CM

## 2024-02-09 DIAGNOSIS — E55.9 VITAMIN D DEFICIENCY, UNSPECIFIED: ICD-10-CM

## 2024-02-09 PROCEDURE — 99214 OFFICE O/P EST MOD 30 MIN: CPT

## 2024-02-09 RX ORDER — TOPIRAMATE 25 MG/1
25 TABLET, FILM COATED ORAL
Qty: 180 | Refills: 1 | Status: DISCONTINUED | COMMUNITY
Start: 2022-04-05 | End: 2024-02-09

## 2024-02-09 RX ORDER — PREDNISONE 20 MG/1
20 TABLET ORAL DAILY
Qty: 30 | Refills: 0 | Status: DISCONTINUED | COMMUNITY
Start: 2021-09-30 | End: 2024-02-09

## 2024-02-09 RX ORDER — CETIRIZINE HCL 10 MG
TABLET ORAL
Refills: 0 | Status: DISCONTINUED | COMMUNITY
End: 2024-02-09

## 2024-02-09 RX ORDER — AZITHROMYCIN 250 MG/1
250 TABLET, FILM COATED ORAL
Qty: 1 | Refills: 0 | Status: DISCONTINUED | COMMUNITY
Start: 2018-02-28 | End: 2024-02-09

## 2024-02-09 RX ORDER — DICLOFENAC SODIUM 1% 10 MG/G
1 GEL TOPICAL DAILY
Qty: 1 | Refills: 3 | Status: DISCONTINUED | COMMUNITY
Start: 2022-09-02 | End: 2024-02-09

## 2024-02-09 RX ORDER — FLUTICASONE PROPIONATE AND SALMETEROL 250; 50 UG/1; UG/1
250-50 POWDER RESPIRATORY (INHALATION)
Qty: 1 | Refills: 4 | Status: DISCONTINUED | COMMUNITY
Start: 2023-04-18 | End: 2024-02-09

## 2024-02-09 RX ORDER — FLUTICASONE PROPIONATE 50 UG/1
50 SPRAY, METERED NASAL DAILY
Qty: 1 | Refills: 4 | Status: DISCONTINUED | COMMUNITY
Start: 2023-08-29 | End: 2024-02-09

## 2024-02-09 RX ORDER — B-COMPLEX WITH VITAMIN C
TABLET ORAL
Refills: 0 | Status: DISCONTINUED | COMMUNITY
End: 2024-02-09

## 2024-02-10 ENCOUNTER — NON-APPOINTMENT (OUTPATIENT)
Age: 49
End: 2024-02-10

## 2024-02-12 LAB
25(OH)D3 SERPL-MCNC: 28.3 NG/ML
ALBUMIN SERPL ELPH-MCNC: 4.4 G/DL
ALP BLD-CCNC: 74 U/L
ALT SERPL-CCNC: 16 U/L
ANION GAP SERPL CALC-SCNC: 12 MMOL/L
AST SERPL-CCNC: 21 U/L
BASOPHILS # BLD AUTO: 0.08 K/UL
BASOPHILS NFR BLD AUTO: 1 %
BILIRUB SERPL-MCNC: 0.6 MG/DL
BUN SERPL-MCNC: 7 MG/DL
CALCIUM SERPL-MCNC: 9.2 MG/DL
CHLORIDE SERPL-SCNC: 104 MMOL/L
CHOLEST SERPL-MCNC: 147 MG/DL
CO2 SERPL-SCNC: 24 MMOL/L
CREAT SERPL-MCNC: 0.71 MG/DL
EGFR: 106 ML/MIN/1.73M2
EOSINOPHIL # BLD AUTO: 0.39 K/UL
EOSINOPHIL NFR BLD AUTO: 5.1 %
ESTIMATED AVERAGE GLUCOSE: 120 MG/DL
GLUCOSE SERPL-MCNC: 90 MG/DL
HBA1C MFR BLD HPLC: 5.8 %
HCT VFR BLD CALC: 42.4 %
HDLC SERPL-MCNC: 52 MG/DL
HGB BLD-MCNC: 13.9 G/DL
IMM GRANULOCYTES NFR BLD AUTO: 0.1 %
LDLC SERPL CALC-MCNC: 81 MG/DL
LYMPHOCYTES # BLD AUTO: 2.82 K/UL
LYMPHOCYTES NFR BLD AUTO: 36.8 %
MAN DIFF?: NORMAL
MCHC RBC-ENTMCNC: 30.5 PG
MCHC RBC-ENTMCNC: 32.8 GM/DL
MCV RBC AUTO: 93 FL
MONOCYTES # BLD AUTO: 0.68 K/UL
MONOCYTES NFR BLD AUTO: 8.9 %
NEUTROPHILS # BLD AUTO: 3.69 K/UL
NEUTROPHILS NFR BLD AUTO: 48.1 %
NONHDLC SERPL-MCNC: 94 MG/DL
PLATELET # BLD AUTO: 264 K/UL
POTASSIUM SERPL-SCNC: 3.8 MMOL/L
PROT SERPL-MCNC: 7.2 G/DL
RBC # BLD: 4.56 M/UL
RBC # FLD: 13.8 %
SODIUM SERPL-SCNC: 140 MMOL/L
TRIGL SERPL-MCNC: 69 MG/DL
TSH SERPL-ACNC: 1.48 UIU/ML
VIT B12 SERPL-MCNC: 1009 PG/ML
WBC # FLD AUTO: 7.67 K/UL

## 2024-02-12 NOTE — ASSESSMENT
[FreeTextEntry1] : Polycystic ovarian syndrome. Elevated body mass index. Elevated hemoglobin A1c. She met criteria for polycystic ovarian syndrome in the setting of oligomenorrhea and clinical evidence of hyperandrogenism; her menstrual periods have most recently been regular. We have discussed the potential risks of polycystic ovarian syndrome, including but not limited to endometrial hyperplasia/cancer, issues with androgen excess, metabolic effects/insulin resistance. We have discussed use of progesterone as needed if lack of withdrawal bleeding for 90 days. We reviewed lifestyle modification for weight loss. Insurance has not covered visits with nutrition at our office. We have discussed pharmacologic options for weight loss. She has not tolerated metformin, bupropion, or phentermine; she declines consideration of topiramate. Insurance does not cover GLP-1 receptor agonist therapy.  Lifestyle modification  Euthyroid Hashimoto's disease. She has a history of positive thyroid peroxidase and thyroglobulin antibodies. She has been biochemically euthyroid. Monitor TSH annually or earlier if symptoms  Routine health. We will send laboratory evaluation as below.

## 2024-02-12 NOTE — PHYSICAL EXAM
[Alert] : alert [Healthy Appearance] : healthy appearance [No Acute Distress] : no acute distress [Normal Sclera/Conjunctiva] : normal sclera/conjunctiva [Normal Hearing] : hearing was normal [No Neck Mass] : no neck mass was observed [No LAD] : no lymphadenopathy [Supple] : the neck was supple [Thyroid Not Enlarged] : the thyroid was not enlarged [No Respiratory Distress] : no respiratory distress [No Stigmata of Cushings Syndrome] : no stigmata of Cushings Syndrome [Normal Gait] : normal gait [Normal Insight/Judgement] : insight and judgment were intact [Kyphosis] : no kyphosis present [Acanthosis Nigricans] : no acanthosis nigricans [de-identified] : no moon facies, no supraclavicular fat pads

## 2024-02-12 NOTE — ADDENDUM
[FreeTextEntry1] : Recent laboratory results as below; discussed with Ms. Hale. HbA1c 5.8%; recommended lifestyle modifications. Vitamin D within the standard reference 20-50 ng/mL. TSH and other test results within range. 2/12/24

## 2024-02-12 NOTE — HISTORY OF PRESENT ILLNESS
[FreeTextEntry1] : Ms. Hale is a 47 year-old woman presenting for follow-up of her endocrine issues. I saw her for an initial visit in December 2018 and last in April 2022.  Polycystic ovarian syndrome. Elevated body mass index. Elevated HbA1c. HbA1c 5.8% in March 2023. She was diagnosed with polycystic ovarian syndrome in her twenties in the setting of oligomenorrhea, acne, and hirsutism.  She was previously on a combined oral contraceptive pill, off since around March 2017. She has had withdrawal bleeding every month.  Acne and hirsutism currently under good control. Her early adult weight was 90 pounds, gradually increased up to 155 pounds. She was down to 115 pounds following a very restrictive diet. She was down to 135 pounds while on bupropion and metformin.  She had mood issues on bupropion. She had forgetfulness with metformin. She had palpitations with phentermine. She has declined consideration of topiramate. GLP-1 receptor agonist therapy not covered by insurance.  Euthyroid Hashimoto's disease. She has a history of positive thyroid peroxidase and thyroglobulin antibodies. She has been biochemically euthyroid; no history of levothyroxine therapy.  Interim History  Last visit we had discussed topiramate; she was hesitant to start given possible side effects.  She has had weight gain. She recently restarted intermittent fasting, 16:8. She has had regular menstrual periods.  She has seen multiple providers; notes reviewed. Last laboratory results reviewed.  Medical and surgical history, medications, allergies, social and family history reviewed and updated as needed.

## 2024-03-25 ENCOUNTER — APPOINTMENT (OUTPATIENT)
Dept: INTERNAL MEDICINE | Facility: CLINIC | Age: 49
End: 2024-03-25
Payer: COMMERCIAL

## 2024-03-25 VITALS
SYSTOLIC BLOOD PRESSURE: 137 MMHG | BODY MASS INDEX: 29.81 KG/M2 | WEIGHT: 142 LBS | HEART RATE: 62 BPM | OXYGEN SATURATION: 100 % | HEIGHT: 58 IN | DIASTOLIC BLOOD PRESSURE: 80 MMHG | TEMPERATURE: 98.3 F

## 2024-03-25 DIAGNOSIS — E66.9 OBESITY, UNSPECIFIED: ICD-10-CM

## 2024-03-25 DIAGNOSIS — Z00.00 ENCOUNTER FOR GENERAL ADULT MEDICAL EXAMINATION W/OUT ABNORMAL FINDINGS: ICD-10-CM

## 2024-03-25 DIAGNOSIS — Z12.83 ENCOUNTER FOR SCREENING FOR MALIGNANT NEOPLASM OF SKIN: ICD-10-CM

## 2024-03-25 DIAGNOSIS — M54.6 PAIN IN THORACIC SPINE: ICD-10-CM

## 2024-03-25 DIAGNOSIS — G89.29 PAIN IN THORACIC SPINE: ICD-10-CM

## 2024-03-25 DIAGNOSIS — E06.3 AUTOIMMUNE THYROIDITIS: ICD-10-CM

## 2024-03-25 DIAGNOSIS — U07.1 COVID-19: ICD-10-CM

## 2024-03-25 DIAGNOSIS — E66.3 OVERWEIGHT: ICD-10-CM

## 2024-03-25 DIAGNOSIS — Q67.5 CONGENITAL DEFORMITY OF SPINE: ICD-10-CM

## 2024-03-25 DIAGNOSIS — R73.09 OTHER ABNORMAL GLUCOSE: ICD-10-CM

## 2024-03-25 PROCEDURE — 99396 PREV VISIT EST AGE 40-64: CPT

## 2024-03-25 PROCEDURE — 99212 OFFICE O/P EST SF 10 MIN: CPT | Mod: 25

## 2024-03-25 RX ORDER — MAGNESIUM OXIDE/MAG AA CHELATE 300 MG
CAPSULE ORAL
Refills: 0 | Status: DISCONTINUED | COMMUNITY
End: 2024-03-25

## 2024-03-25 NOTE — REVIEW OF SYSTEMS
[Nasal Discharge] : nasal discharge [Shortness Of Breath] : shortness of breath [Postnasal Drip] : postnasal drip [Wheezing] : wheezing [Cough] : cough [Insomnia] : insomnia [Dyspnea on Exertion] : dyspnea on exertion [Negative] : Heme/Lymph [Sore Throat] : no sore throat

## 2024-03-25 NOTE — COUNSELING
[Potential consequences of obesity discussed] : Potential consequences of obesity discussed [Benefits of weight loss discussed] : Benefits of weight loss discussed [Structured Weight Management Program suggested:] : Structured weight management program suggested [Encouraged to maintain food diary] : Encouraged to maintain food diary [Encouraged to increase physical activity] : Encouraged to increase physical activity [Target Wt Loss Goal ___] : Weight Loss Goals: Target weight loss goal [unfilled] lbs [Encouraged to use exercise tracking device] : Encouraged to use exercise tracking device [Decrease Portions] : decrease portions [Weigh Self Weekly] : weigh self weekly [Keep Food Diary] : keep food diary [Good understanding] : Patient has a good understanding of disease, goals and obesity follow-up plan [FreeTextEntry4] : 15

## 2024-03-25 NOTE — ASSESSMENT
[FreeTextEntry1] : Health Maintenance Daily aerobic exercises strongly recommended. No STD risk or substance abuse per patient report. Occasional gender specific self-examination is suggested. No depression. Competent with ADLs. Yearly or biyearly GYN visit recommended. Continue yearly mammography Completed primary COVID-vaccine series with monovalent booster but did not have subsequent boosters or updated variant specific vaccine. Did not get flu vaccine this year but will make sure to get it next year.  Obesity Patient has lost 5 pounds in the past year but her BMI is still close to 30 and at least a no other 15 pound weight loss is recommended. Obesity-associated mortality/morbidity was reviewed including hypertension, fatty liver, heart disease, increased risk for malignancy, hormonal dysfunction, and knee arthritis. Lifestyle management options were discussed including daily aerobic exercise as tolerated and structured low-fat/calorie diet.  Specific recommendations provided. Patient declines referral to nutritionist at this time given the relatively small amount of recommended weight reduction.  Patient will attempt to lose the weight on her own.  Asthma Currently in remission.   Using albuterol once every 2 weeks without increasing requirements.  Prediabetes BMI from last month was 5.8, unchanged for the past 3 years but up from 5.5 prior to that. This constitutes borderline prediabetes and the rationale (to reduce vascular and other complications) as well as the goal (hemoglobin A1c under 5.7) for treatment were  explained. Lifestyle management recommended (identical to the obesity recommendations above).  Chronic left mid thoracic back pain Unchanged for 2 years or so. At patient request, referral provided for spinal orthopedics evaluation.

## 2024-03-25 NOTE — HISTORY OF PRESENT ILLNESS
[FreeTextEntry1] : 49-year-old female with history of PCOs, Hashimoto's disease euthyroid, hyperlipidemia, elevated A1c, and anxiety presents for yearly CPE. Since she was last seen approximately one year ago, she denies hospitalization, surgery, new medical diagnoses including Covid

## 2024-03-25 NOTE — HEALTH RISK ASSESSMENT
[Good] : ~his/her~  mood as  good [No] : In the past 12 months have you used drugs other than those required for medical reasons? No [No falls in past year] : Patient reported no falls in the past year [0] : 2) Feeling down, depressed, or hopeless: Not at all (0) [PHQ-2 Negative - No further assessment needed] : PHQ-2 Negative - No further assessment needed [Patient reported mammogram was normal] : Patient reported mammogram was normal [Patient reported colonoscopy was normal] : Patient reported colonoscopy was normal [HIV test declined] : HIV test declined [Hepatitis C test declined] : Hepatitis C test declined [With Significant Other] : lives with significant other [Employed] : employed [] :  [Sexually Active] : sexually active [Fully functional (bathing, dressing, toileting, transferring, walking, feeding)] : Fully functional (bathing, dressing, toileting, transferring, walking, feeding) [Fully functional (using the telephone, shopping, preparing meals, housekeeping, doing laundry, using] : Fully functional and needs no help or supervision to perform IADLs (using the telephone, shopping, preparing meals, housekeeping, doing laundry, using transportation, managing medications and managing finances) [Reports normal functional visual acuity (ie: able to read med bottle)] : Reports normal functional visual acuity [Sunscreen] : uses sunscreen [Seat Belt] :  uses seat belt [Patient/Caregiver not ready to engage] : , patient/caregiver not ready to engage [Never] : Never [Audit-CScore] : 0 [[id number]] : 0 [Change in mental status noted] : No change in mental status noted [High Risk Behavior] : no high risk behavior [Reports changes in hearing] : Reports no changes in hearing [Reports changes in vision] : Reports no changes in vision [Reports changes in dental health] : Reports no changes in dental health [TB Exposure] : is not being exposed to tuberculosis [MammogramDate] : 03/2024 [PapSmearDate] : 8/2020 [ColonoscopyDate] : 06/2021 [AdvancecareDate] : 03/2024

## 2024-05-06 ENCOUNTER — APPOINTMENT (OUTPATIENT)
Dept: INTERNAL MEDICINE | Facility: CLINIC | Age: 49
End: 2024-05-06
Payer: COMMERCIAL

## 2024-05-06 VITALS
WEIGHT: 141 LBS | BODY MASS INDEX: 29.6 KG/M2 | TEMPERATURE: 98.1 F | HEART RATE: 98 BPM | DIASTOLIC BLOOD PRESSURE: 85 MMHG | HEIGHT: 58 IN | SYSTOLIC BLOOD PRESSURE: 131 MMHG | OXYGEN SATURATION: 98 %

## 2024-05-06 DIAGNOSIS — R06.2 WHEEZING: ICD-10-CM

## 2024-05-06 PROCEDURE — G2211 COMPLEX E/M VISIT ADD ON: CPT

## 2024-05-06 PROCEDURE — 99214 OFFICE O/P EST MOD 30 MIN: CPT

## 2024-05-06 RX ORDER — NEBULIZER ACCESSORIES
KIT MISCELLANEOUS
Qty: 1 | Refills: 0 | Status: ACTIVE | COMMUNITY
Start: 2024-05-06 | End: 1900-01-01

## 2024-05-06 RX ORDER — IPRATROPIUM BROMIDE AND ALBUTEROL SULFATE 2.5; .5 MG/3ML; MG/3ML
0.5-2.5 (3) SOLUTION RESPIRATORY (INHALATION) 4 TIMES DAILY
Qty: 9 | Refills: 2 | Status: ACTIVE | COMMUNITY
Start: 2024-05-06 | End: 1900-01-01

## 2024-05-06 NOTE — ASSESSMENT
[FreeTextEntry1] : Asthma exacerbation likely due to seasonal allergies O2 saturation = 98% (at rest); 96% (after mild exertion) Progressive wheezing/chest tightness despite using albuterol. Patient declines ER evaluation for IV and nebulizer treatment. Will start oral steroids.  Prescription submitted to pharmacy for prednisone 20 mg with instructions to take 3 tablets for the next 4 days, then taper to 2 tablets/day and return to office in 1 week for reevaluation. Will also start on albuterol nebulizer at home.  Prescription submitted for nebulizer kit and for ipratropium albuterol solution with instructions for how to use. Patient instructed that she must be evaluated in the ER if she has not begun to respond to this treatment within the next 36 hours and she indicates understanding and agreement. Otherwise, she will be reevaluated here next week.  If she is doing well, will resume Symbicort or similar ICS/LABA combination, at least for the next 2 to 3 months.

## 2024-05-06 NOTE — PHYSICAL EXAM
[Normal] : no jugular venous distention, supple, no lymphadenopathy and the thyroid was normal and there were no nodules present [de-identified] : Diffuse moderate-severe expiratory >inspiratory wheezes.

## 2024-05-06 NOTE — HISTORY OF PRESENT ILLNESS
[FreeTextEntry1] : Asthma exacerbation [de-identified] : Patient reports mild recurrent asthma symptoms starting about 2 weeks ago, initially responsive to albuterol.  (Note that she discontinued Symbicort over a year ago.)  Wheezing and cough slowly progressed despite increasing frequency of albuterol use, and, starting about 2 to 3 days ago, symptoms stopped responding to albuterol inhalation.  Symptoms now interfering with sleep although she denies debilitating SOB/CAMPOS.  She also denies history of recent fever, sinusitis symptoms, sore throat, or sputum.  In her assessment, exacerbation was caused by seasonal allergies.

## 2024-06-03 ENCOUNTER — APPOINTMENT (OUTPATIENT)
Dept: INTERNAL MEDICINE | Facility: CLINIC | Age: 49
End: 2024-06-03
Payer: COMMERCIAL

## 2024-06-03 VITALS
HEIGHT: 58 IN | SYSTOLIC BLOOD PRESSURE: 132 MMHG | WEIGHT: 146 LBS | HEART RATE: 88 BPM | OXYGEN SATURATION: 96 % | BODY MASS INDEX: 30.64 KG/M2 | DIASTOLIC BLOOD PRESSURE: 87 MMHG | TEMPERATURE: 98.5 F

## 2024-06-03 DIAGNOSIS — M26.621 ARTHRALGIA OF RIGHT TEMPOROMANDIBULAR JOINT: ICD-10-CM

## 2024-06-03 DIAGNOSIS — J45.909 UNSPECIFIED ASTHMA, UNCOMPLICATED: ICD-10-CM

## 2024-06-03 PROCEDURE — 99214 OFFICE O/P EST MOD 30 MIN: CPT

## 2024-06-03 PROCEDURE — G2211 COMPLEX E/M VISIT ADD ON: CPT

## 2024-06-03 RX ORDER — BUDESONIDE 90 UG/1
90 AEROSOL, POWDER RESPIRATORY (INHALATION)
Qty: 1 | Refills: 3 | Status: ACTIVE | COMMUNITY
Start: 2024-06-03 | End: 1900-01-01

## 2024-06-03 RX ORDER — DICLOFENAC SODIUM 1% 10 MG/G
1 GEL TOPICAL DAILY
Qty: 1 | Refills: 3 | Status: ACTIVE | COMMUNITY
Start: 2024-06-03 | End: 1900-01-01

## 2024-06-03 NOTE — PHYSICAL EXAM
[Normal] : no respiratory distress, lungs were clear to auscultation bilaterally and no accessory muscle use [de-identified] : Complete resolution of previous wheezing [de-identified] : Moderate clicking and mild tenderness to deep palpation over right TMJ.

## 2024-06-03 NOTE — ASSESSMENT
[FreeTextEntry1] : 1.)  Asthma Rapid resolution of recent exacerbation with moderate dose oral steroids. Current exam is essentially negative and patient has not required rescue treatment for the past several weeks. Explained likelihood of mild residual bronchial inflammation even though she is asymptomatic and would recommend controller medication at least for the next few months.  Options were discussed.  As patient indicates that she did well with it in the past, prescription was submitted to her pharmacy for budesonide inhaler with instructions for use.  #2) TMJ arthropathy Patient reassured that her ear exam is normal bilaterally and right-sided jaw pain does not appear to be otogenic. Symptoms as described and exam today (clicking and tenderness) are strongly suggestive of TMJ arthropathy although she denies bruxism as far she is aware. Explained the underlying basis, prognosis, possible further workup, and treatment for this condition. As symptoms are currently low-grade and have been improving, further workup does not seem to be necessary at this time but referral to ENT can be provided if symptoms exacerbate in future. Agree with hot compresses and would continue on an as-needed basis.  Would also consider topical NSAIDs and prescription for diclofenac gel was submitted to her pharmacy with instructions for use.

## 2024-06-03 NOTE — HISTORY OF PRESENT ILLNESS
[FreeTextEntry1] : Asthma exacerbation [de-identified] : Patient reports that she completed treatment with prednisone as previously prescribed without any significant side effects and experienced full resolution of wheezing by day 3.  In addition, she denies any recurrence now off prednisone for the past 2 to 3 weeks and has not required the use of albuterol MDI during this time.  States that she never required use of albuterol nebulizer. Wishes to discuss recommendations going forward. She also complains of intermittent right jaw pain over the past several weeks (starting prior to prednisone use) which responds well to intermittent use of heating pad but is not completely resolved.  Wishes to discuss diagnosis and treatment recommendations.

## 2024-06-13 ENCOUNTER — RX RENEWAL (OUTPATIENT)
Age: 49
End: 2024-06-13

## 2024-06-13 RX ORDER — PREDNISONE 20 MG/1
20 TABLET ORAL DAILY
Qty: 30 | Refills: 0 | Status: ACTIVE | COMMUNITY
Start: 2018-02-28 | End: 1900-01-01

## 2024-07-23 ENCOUNTER — RX RENEWAL (OUTPATIENT)
Age: 49
End: 2024-07-23

## 2024-08-19 ENCOUNTER — LABORATORY RESULT (OUTPATIENT)
Age: 49
End: 2024-08-19

## 2024-08-19 ENCOUNTER — APPOINTMENT (OUTPATIENT)
Dept: INTERNAL MEDICINE | Facility: CLINIC | Age: 49
End: 2024-08-19
Payer: COMMERCIAL

## 2024-08-19 VITALS
BODY MASS INDEX: 31.07 KG/M2 | HEART RATE: 81 BPM | HEIGHT: 58 IN | SYSTOLIC BLOOD PRESSURE: 130 MMHG | OXYGEN SATURATION: 97 % | TEMPERATURE: 98.7 F | WEIGHT: 148 LBS | DIASTOLIC BLOOD PRESSURE: 80 MMHG

## 2024-08-19 DIAGNOSIS — J45.909 UNSPECIFIED ASTHMA, UNCOMPLICATED: ICD-10-CM

## 2024-08-19 PROCEDURE — 99213 OFFICE O/P EST LOW 20 MIN: CPT

## 2024-08-19 PROCEDURE — 36415 COLL VENOUS BLD VENIPUNCTURE: CPT

## 2024-08-19 PROCEDURE — G2211 COMPLEX E/M VISIT ADD ON: CPT

## 2024-08-19 NOTE — HISTORY OF PRESENT ILLNESS
[FreeTextEntry1] : Dyspnea Wheezing [de-identified] : Patient reports recurrence of audible wheezing, intermittent cough, chest fullness sensation, and dyspnea on exertion over the past week or 2.  Use of albuterol MDI and/or nebulizer has increased to at least twice a day.  This has occurred about 2 months after recovering from acute asthma exacerbation in May requiring course of oral steroids.  She admits to noncompliance with using Symbicort as a controller medication (resulting from forgetfulness not from adverse side effects). She otherwise denies any specific exposure to known allergens..

## 2024-08-19 NOTE — PHYSICAL EXAM
[No Acute Distress] : no acute distress [No Carotid Bruits] : no carotid bruits [No Edema] : there was no peripheral edema [Normal] : affect was normal and insight and judgment were intact [de-identified] : Moderate diffuse expiratory wheezing in bilateral lower lobe lung zones.  Essentially normal air movement.  No tachypnea.

## 2024-08-19 NOTE — ASSESSMENT
[FreeTextEntry1] : Asthma exacerbation Based on patient report, this is likely due to noncompliance with controller medication. Importance of full compliance was emphasized.  New prescription submitted for Symbicort at higher dose (160 mcg up from 80 mcg) with explicit instructions for use (2 puffs twice a day). Patient indicates understanding and expresses her intention to follow these instructions. A new prescription was also submitted for prednisone 20 mg with instructions to take 2 pills a day x 3 days followed by 1 pill a day x 3 days as treatment for acute exacerbation. Patient understands that prednisone and Symbicort can be used together. Blood work sent today for total eosinophil count in anticipation of possible use of Dupixent if exacerbations continue despite compliance with controller treatment.

## 2024-08-19 NOTE — PHYSICAL EXAM
[No Acute Distress] : no acute distress [No Carotid Bruits] : no carotid bruits [No Edema] : there was no peripheral edema [Normal] : affect was normal and insight and judgment were intact [de-identified] : Moderate diffuse expiratory wheezing in bilateral lower lobe lung zones.  Essentially normal air movement.  No tachypnea.

## 2024-08-19 NOTE — HISTORY OF PRESENT ILLNESS
[FreeTextEntry1] : Dyspnea Wheezing [de-identified] : Patient reports recurrence of audible wheezing, intermittent cough, chest fullness sensation, and dyspnea on exertion over the past week or 2.  Use of albuterol MDI and/or nebulizer has increased to at least twice a day.  This has occurred about 2 months after recovering from acute asthma exacerbation in May requiring course of oral steroids.  She admits to noncompliance with using Symbicort as a controller medication (resulting from forgetfulness not from adverse side effects). She otherwise denies any specific exposure to known allergens..

## 2024-08-20 LAB
A ALTERNATA IGE QN: <0.1 KUA/L
A FUMIGATUS IGE QN: <0.1 KUA/L
C ALBICANS IGE QN: <0.1 KUA/L
C HERBARUM IGE QN: <0.1 KUA/L
CAT DANDER IGE QN: 0.13 KUA/L
COMMON RAGWEED IGE QN: 0.57 KUA/L
D FARINAE IGE QN: <0.1 KUA/L
D PTERONYSS IGE QN: <0.1 KUA/L
DEPRECATED A ALTERNATA IGE RAST QL: 0 (ref 0–?)
DEPRECATED A FUMIGATUS IGE RAST QL: 0 (ref 0–?)
DEPRECATED C ALBICANS IGE RAST QL: 0
DEPRECATED C HERBARUM IGE RAST QL: 0 (ref 0–?)
DEPRECATED CAT DANDER IGE RAST QL: NORMAL (ref 0–?)
DEPRECATED COMMON RAGWEED IGE RAST QL: 1 (ref 0–?)
DEPRECATED D FARINAE IGE RAST QL: 0 (ref 0–?)
DEPRECATED D PTERONYSS IGE RAST QL: 0 (ref 0–?)
DEPRECATED DOG DANDER IGE RAST QL: 0 (ref 0–?)
DEPRECATED M RACEMOSUS IGE RAST QL: 0
DEPRECATED ROACH IGE RAST QL: 0 (ref 0–?)
DEPRECATED TIMOTHY IGE RAST QL: 0 (ref 0–?)
DEPRECATED WHITE OAK IGE RAST QL: 3 (ref 0–?)
DOG DANDER IGE QN: <0.1 KUA/L
EOSINOPHIL # BLD MANUAL: 530 /UL
M RACEMOSUS IGE QN: <0.1 KUA/L
ROACH IGE QN: <0.1 KUA/L
TIMOTHY IGE QN: <0.1 KUA/L
WHITE OAK IGE QN: 5.97 KUA/L

## 2024-08-22 ENCOUNTER — TRANSCRIPTION ENCOUNTER (OUTPATIENT)
Age: 49
End: 2024-08-22

## 2024-08-30 ENCOUNTER — RX RENEWAL (OUTPATIENT)
Age: 49
End: 2024-08-30

## 2024-09-24 ENCOUNTER — APPOINTMENT (OUTPATIENT)
Dept: OTOLARYNGOLOGY | Facility: CLINIC | Age: 49
End: 2024-09-24

## 2024-12-02 ENCOUNTER — APPOINTMENT (OUTPATIENT)
Dept: INTERNAL MEDICINE | Facility: CLINIC | Age: 49
End: 2024-12-02

## 2024-12-06 ENCOUNTER — APPOINTMENT (OUTPATIENT)
Dept: INTERNAL MEDICINE | Facility: CLINIC | Age: 49
End: 2024-12-06

## 2025-01-31 ENCOUNTER — APPOINTMENT (OUTPATIENT)
Dept: OTOLARYNGOLOGY | Facility: CLINIC | Age: 50
End: 2025-01-31
Payer: COMMERCIAL

## 2025-01-31 ENCOUNTER — NON-APPOINTMENT (OUTPATIENT)
Age: 50
End: 2025-01-31

## 2025-01-31 VITALS — BODY MASS INDEX: 30.44 KG/M2 | WEIGHT: 145 LBS | HEIGHT: 58 IN

## 2025-01-31 DIAGNOSIS — J34.2 DEVIATED NASAL SEPTUM: ICD-10-CM

## 2025-01-31 DIAGNOSIS — J31.0 CHRONIC RHINITIS: ICD-10-CM

## 2025-01-31 DIAGNOSIS — K21.9 GASTRO-ESOPHAGEAL REFLUX DISEASE W/OUT ESOPHAGITIS: ICD-10-CM

## 2025-01-31 PROCEDURE — 99213 OFFICE O/P EST LOW 20 MIN: CPT | Mod: 25

## 2025-01-31 PROCEDURE — 31575 DIAGNOSTIC LARYNGOSCOPY: CPT

## 2025-01-31 RX ORDER — OMEPRAZOLE 40 MG/1
40 CAPSULE, DELAYED RELEASE ORAL
Refills: 0 | Status: ACTIVE | COMMUNITY

## 2025-01-31 RX ORDER — FAMOTIDINE 40 MG/1
40 TABLET, FILM COATED ORAL
Refills: 0 | Status: ACTIVE | COMMUNITY

## 2025-01-31 RX ORDER — FLUTICASONE PROPIONATE 50 UG/1
50 SPRAY, METERED NASAL DAILY
Qty: 1 | Refills: 4 | Status: ACTIVE | COMMUNITY
Start: 2025-01-31 | End: 1900-01-01

## 2025-02-24 ENCOUNTER — APPOINTMENT (OUTPATIENT)
Dept: OTOLARYNGOLOGY | Facility: CLINIC | Age: 50
End: 2025-02-24

## 2025-02-28 ENCOUNTER — APPOINTMENT (OUTPATIENT)
Dept: INTERNAL MEDICINE | Facility: CLINIC | Age: 50
End: 2025-02-28

## 2025-03-31 ENCOUNTER — APPOINTMENT (OUTPATIENT)
Dept: INTERNAL MEDICINE | Facility: CLINIC | Age: 50
End: 2025-03-31

## 2025-06-02 ENCOUNTER — APPOINTMENT (OUTPATIENT)
Dept: INTERNAL MEDICINE | Facility: CLINIC | Age: 50
End: 2025-06-02
Payer: COMMERCIAL

## 2025-06-02 ENCOUNTER — LABORATORY RESULT (OUTPATIENT)
Age: 50
End: 2025-06-02

## 2025-06-02 VITALS
HEIGHT: 58 IN | DIASTOLIC BLOOD PRESSURE: 80 MMHG | HEART RATE: 88 BPM | TEMPERATURE: 98.2 F | OXYGEN SATURATION: 98 % | BODY MASS INDEX: 30.44 KG/M2 | SYSTOLIC BLOOD PRESSURE: 128 MMHG | WEIGHT: 145 LBS

## 2025-06-02 DIAGNOSIS — E78.5 HYPERLIPIDEMIA, UNSPECIFIED: ICD-10-CM

## 2025-06-02 DIAGNOSIS — J45.909 UNSPECIFIED ASTHMA, UNCOMPLICATED: ICD-10-CM

## 2025-06-02 DIAGNOSIS — Z12.39 ENCOUNTER FOR OTHER SCREENING FOR MALIGNANT NEOPLASM OF BREAST: ICD-10-CM

## 2025-06-02 DIAGNOSIS — Z23 ENCOUNTER FOR IMMUNIZATION: ICD-10-CM

## 2025-06-02 DIAGNOSIS — E06.3 AUTOIMMUNE THYROIDITIS: ICD-10-CM

## 2025-06-02 DIAGNOSIS — E55.9 VITAMIN D DEFICIENCY, UNSPECIFIED: ICD-10-CM

## 2025-06-02 DIAGNOSIS — Z00.00 ENCOUNTER FOR GENERAL ADULT MEDICAL EXAMINATION W/OUT ABNORMAL FINDINGS: ICD-10-CM

## 2025-06-02 DIAGNOSIS — K21.9 GASTRO-ESOPHAGEAL REFLUX DISEASE W/OUT ESOPHAGITIS: ICD-10-CM

## 2025-06-02 DIAGNOSIS — R73.09 OTHER ABNORMAL GLUCOSE: ICD-10-CM

## 2025-06-02 DIAGNOSIS — E66.811 OBESITY, CLASS 1: ICD-10-CM

## 2025-06-02 PROCEDURE — 90677 PCV20 VACCINE IM: CPT

## 2025-06-02 PROCEDURE — G0447 BEHAVIOR COUNSEL OBESITY 15M: CPT | Mod: NC,59

## 2025-06-02 PROCEDURE — 90715 TDAP VACCINE 7 YRS/> IM: CPT

## 2025-06-02 PROCEDURE — G0009: CPT | Mod: 59

## 2025-06-02 PROCEDURE — 90472 IMMUNIZATION ADMIN EACH ADD: CPT

## 2025-06-02 PROCEDURE — 36415 COLL VENOUS BLD VENIPUNCTURE: CPT

## 2025-06-02 PROCEDURE — 99396 PREV VISIT EST AGE 40-64: CPT | Mod: 25

## 2025-06-03 LAB
25(OH)D3 SERPL-MCNC: 47.4 NG/ML
ALBUMIN SERPL ELPH-MCNC: 4.4 G/DL
ALP BLD-CCNC: 87 U/L
ALT SERPL-CCNC: 21 U/L
ANION GAP SERPL CALC-SCNC: 15 MMOL/L
APO B SERPL-MCNC: 84 MG/DL
APPEARANCE: CLEAR
AST SERPL-CCNC: 23 U/L
BASOPHILS # BLD AUTO: 0.06 K/UL
BASOPHILS NFR BLD AUTO: 0.8 %
BILIRUB SERPL-MCNC: 0.3 MG/DL
BILIRUBIN URINE: NEGATIVE
BLOOD URINE: ABNORMAL
BUN SERPL-MCNC: 14 MG/DL
CALCIUM SERPL-MCNC: 9.9 MG/DL
CHLORIDE SERPL-SCNC: 104 MMOL/L
CHOLEST SERPL-MCNC: 176 MG/DL
CO2 SERPL-SCNC: 23 MMOL/L
COLOR: YELLOW
CREAT SERPL-MCNC: 1.05 MG/DL
EGFRCR SERPLBLD CKD-EPI 2021: 66 ML/MIN/1.73M2
EOSINOPHIL # BLD AUTO: 0.46 K/UL
EOSINOPHIL NFR BLD AUTO: 6.4 %
ESTIMATED AVERAGE GLUCOSE: 114 MG/DL
GLUCOSE QUALITATIVE U: NEGATIVE MG/DL
GLUCOSE SERPL-MCNC: 95 MG/DL
HBA1C MFR BLD HPLC: 5.6 %
HCT VFR BLD CALC: 45 %
HDLC SERPL-MCNC: 59 MG/DL
HGB BLD-MCNC: 14.7 G/DL
IMM GRANULOCYTES NFR BLD AUTO: 0.1 %
KETONES URINE: NEGATIVE MG/DL
LDLC SERPL-MCNC: 104 MG/DL
LEUKOCYTE ESTERASE URINE: ABNORMAL
LYMPHOCYTES # BLD AUTO: 2.53 K/UL
LYMPHOCYTES NFR BLD AUTO: 35.3 %
MAN DIFF?: NORMAL
MCHC RBC-ENTMCNC: 30.6 PG
MCHC RBC-ENTMCNC: 32.7 G/DL
MCV RBC AUTO: 93.8 FL
MONOCYTES # BLD AUTO: 0.64 K/UL
MONOCYTES NFR BLD AUTO: 8.9 %
NEUTROPHILS # BLD AUTO: 3.47 K/UL
NEUTROPHILS NFR BLD AUTO: 48.5 %
NITRITE URINE: NEGATIVE
NONHDLC SERPL-MCNC: 117 MG/DL
PH URINE: 6.5
PLATELET # BLD AUTO: 267 K/UL
POTASSIUM SERPL-SCNC: 3.7 MMOL/L
PROT SERPL-MCNC: 7.5 G/DL
PROTEIN URINE: NEGATIVE MG/DL
RBC # BLD: 4.8 M/UL
RBC # FLD: 13.2 %
SODIUM SERPL-SCNC: 141 MMOL/L
SPECIFIC GRAVITY URINE: 1.02
T3FREE SERPL-MCNC: 2.96 PG/ML
T4 FREE SERPL-MCNC: 1.3 NG/DL
TRIGL SERPL-MCNC: 66 MG/DL
TSH SERPL-ACNC: 1.54 UIU/ML
UROBILINOGEN URINE: 1 MG/DL
WBC # FLD AUTO: 7.17 K/UL

## 2025-06-04 LAB
APO LP(A) SERPL-MCNC: 66.7 NMOL/L
HBV CORE IGM SER QL: NONREACTIVE
HBV SURFACE AB SER QL: NONREACTIVE
HBV SURFACE AG SER QL: NONREACTIVE
HCV AB SER QL: NONREACTIVE
HCV S/CO RATIO: 0.15 S/CO
HEPATITIS A IGG ANTIBODY: REACTIVE
MEV IGG FLD QL IA: >300 AU/ML
MEV IGG+IGM SER-IMP: POSITIVE
MUV AB SER-ACNC: POSITIVE
MUV IGG SER QL IA: 92.6 AU/ML
RUBV IGG FLD-ACNC: 12.2 INDEX
RUBV IGG SER-IMP: POSITIVE